# Patient Record
Sex: FEMALE | Race: WHITE | NOT HISPANIC OR LATINO | Employment: PART TIME | URBAN - METROPOLITAN AREA
[De-identification: names, ages, dates, MRNs, and addresses within clinical notes are randomized per-mention and may not be internally consistent; named-entity substitution may affect disease eponyms.]

---

## 2017-01-12 ENCOUNTER — HOSPITAL ENCOUNTER (OUTPATIENT)
Dept: MAMMOGRAPHY | Facility: HOSPITAL | Age: 57
Discharge: HOME | End: 2017-01-12
Attending: OBSTETRICS & GYNECOLOGY

## 2017-01-12 DIAGNOSIS — Z12.31 VISIT FOR SCREENING MAMMOGRAM: ICD-10-CM

## 2017-01-23 ENCOUNTER — COMMUNICATION - HEALTHEAST (OUTPATIENT)
Dept: INTERNAL MEDICINE | Facility: CLINIC | Age: 57
End: 2017-01-23

## 2017-01-23 DIAGNOSIS — E78.5 HYPERLIPIDEMIA: ICD-10-CM

## 2017-02-17 ENCOUNTER — OFFICE VISIT - HEALTHEAST (OUTPATIENT)
Dept: FAMILY MEDICINE | Facility: CLINIC | Age: 57
End: 2017-02-17

## 2017-02-17 DIAGNOSIS — H60.91 RIGHT OTITIS EXTERNA: ICD-10-CM

## 2017-03-24 ENCOUNTER — COMMUNICATION - HEALTHEAST (OUTPATIENT)
Dept: INTERNAL MEDICINE | Facility: CLINIC | Age: 57
End: 2017-03-24

## 2017-03-24 ENCOUNTER — COMMUNICATION - HEALTHEAST (OUTPATIENT)
Dept: TELEHEALTH | Facility: CLINIC | Age: 57
End: 2017-03-24

## 2017-03-24 ENCOUNTER — OFFICE VISIT - HEALTHEAST (OUTPATIENT)
Dept: FAMILY MEDICINE | Facility: CLINIC | Age: 57
End: 2017-03-24

## 2017-03-24 DIAGNOSIS — Z20.818 EXPOSURE TO STREP THROAT: ICD-10-CM

## 2017-04-13 ENCOUNTER — COMMUNICATION - HEALTHEAST (OUTPATIENT)
Dept: INTERNAL MEDICINE | Facility: CLINIC | Age: 57
End: 2017-04-13

## 2017-04-13 DIAGNOSIS — E78.5 HYPERLIPIDEMIA: ICD-10-CM

## 2017-05-11 ENCOUNTER — COMMUNICATION - HEALTHEAST (OUTPATIENT)
Dept: TELEHEALTH | Facility: CLINIC | Age: 57
End: 2017-05-11

## 2017-05-11 ENCOUNTER — COMMUNICATION - HEALTHEAST (OUTPATIENT)
Dept: INTERNAL MEDICINE | Facility: CLINIC | Age: 57
End: 2017-05-11

## 2017-05-11 ENCOUNTER — OFFICE VISIT - HEALTHEAST (OUTPATIENT)
Dept: INTERNAL MEDICINE | Facility: CLINIC | Age: 57
End: 2017-05-11

## 2017-05-11 DIAGNOSIS — E78.5 HLD (HYPERLIPIDEMIA): ICD-10-CM

## 2017-05-11 DIAGNOSIS — Z00.00 ANNUAL PHYSICAL EXAM: ICD-10-CM

## 2017-05-11 LAB
CHOLEST SERPL-MCNC: 147 MG/DL
FASTING STATUS PATIENT QL REPORTED: ABNORMAL
HDLC SERPL-MCNC: 43 MG/DL
LDLC SERPL CALC-MCNC: 69 MG/DL
TRIGL SERPL-MCNC: 174 MG/DL

## 2017-05-11 ASSESSMENT — MIFFLIN-ST. JEOR: SCORE: 1395.95

## 2017-06-05 ENCOUNTER — COMMUNICATION - HEALTHEAST (OUTPATIENT)
Dept: INTERNAL MEDICINE | Facility: CLINIC | Age: 57
End: 2017-06-05

## 2017-06-05 DIAGNOSIS — E78.5 HYPERLIPIDEMIA: ICD-10-CM

## 2017-06-08 ENCOUNTER — COMMUNICATION - HEALTHEAST (OUTPATIENT)
Dept: INTERNAL MEDICINE | Facility: CLINIC | Age: 57
End: 2017-06-08

## 2017-06-08 DIAGNOSIS — E78.5 HYPERLIPIDEMIA: ICD-10-CM

## 2017-09-20 ENCOUNTER — COMMUNICATION - HEALTHEAST (OUTPATIENT)
Dept: TELEHEALTH | Facility: CLINIC | Age: 57
End: 2017-09-20

## 2017-09-20 ENCOUNTER — OFFICE VISIT - HEALTHEAST (OUTPATIENT)
Dept: FAMILY MEDICINE | Facility: CLINIC | Age: 57
End: 2017-09-20

## 2017-09-20 DIAGNOSIS — R07.0 THROAT PAIN: ICD-10-CM

## 2017-09-20 DIAGNOSIS — J02.9 VIRAL PHARYNGITIS: ICD-10-CM

## 2017-10-18 ENCOUNTER — COMMUNICATION - HEALTHEAST (OUTPATIENT)
Dept: TELEHEALTH | Facility: CLINIC | Age: 57
End: 2017-10-18

## 2017-10-18 ENCOUNTER — OFFICE VISIT - HEALTHEAST (OUTPATIENT)
Dept: INTERNAL MEDICINE | Facility: CLINIC | Age: 57
End: 2017-10-18

## 2017-10-18 DIAGNOSIS — H60.90 OTITIS EXTERNA: ICD-10-CM

## 2017-10-18 DIAGNOSIS — Z82.0 FAMILY HISTORY OF ALZHEIMER'S DISEASE: ICD-10-CM

## 2017-10-25 ENCOUNTER — COMMUNICATION - HEALTHEAST (OUTPATIENT)
Dept: INTERNAL MEDICINE | Facility: CLINIC | Age: 57
End: 2017-10-25

## 2017-10-25 ENCOUNTER — AMBULATORY - HEALTHEAST (OUTPATIENT)
Dept: INTERNAL MEDICINE | Facility: CLINIC | Age: 57
End: 2017-10-25

## 2017-10-26 ENCOUNTER — OFFICE VISIT - HEALTHEAST (OUTPATIENT)
Dept: INTERNAL MEDICINE | Facility: CLINIC | Age: 57
End: 2017-10-26

## 2017-10-26 DIAGNOSIS — Z09 FOLLOW UP: ICD-10-CM

## 2017-10-26 DIAGNOSIS — H60.90 OTITIS EXTERNA: ICD-10-CM

## 2018-02-11 ENCOUNTER — COMMUNICATION - HEALTHEAST (OUTPATIENT)
Dept: INTERNAL MEDICINE | Facility: CLINIC | Age: 58
End: 2018-02-11

## 2018-02-12 ENCOUNTER — COMMUNICATION - HEALTHEAST (OUTPATIENT)
Dept: INTERNAL MEDICINE | Facility: CLINIC | Age: 58
End: 2018-02-12

## 2018-02-26 ENCOUNTER — OFFICE VISIT - HEALTHEAST (OUTPATIENT)
Dept: INTERNAL MEDICINE | Facility: CLINIC | Age: 58
End: 2018-02-26

## 2018-02-26 DIAGNOSIS — Z00.00 ANNUAL PHYSICAL EXAM: ICD-10-CM

## 2018-02-26 DIAGNOSIS — E78.5 HLD (HYPERLIPIDEMIA): ICD-10-CM

## 2018-02-26 DIAGNOSIS — E66.9 OBESITY (BMI 30-39.9): ICD-10-CM

## 2018-02-26 DIAGNOSIS — E55.9 VITAMIN D DEFICIENCY: ICD-10-CM

## 2018-02-26 LAB
25(OH)D3 SERPL-MCNC: 77.2 NG/ML (ref 30–80)
ALBUMIN SERPL-MCNC: 3.9 G/DL (ref 3.5–5)
ALP SERPL-CCNC: 65 U/L (ref 45–120)
ALT SERPL W P-5'-P-CCNC: 43 U/L (ref 0–45)
ANION GAP SERPL CALCULATED.3IONS-SCNC: 11 MMOL/L (ref 5–18)
AST SERPL W P-5'-P-CCNC: 32 U/L (ref 0–40)
BILIRUB DIRECT SERPL-MCNC: 0.2 MG/DL
BILIRUB SERPL-MCNC: 0.6 MG/DL (ref 0–1)
BUN SERPL-MCNC: 13 MG/DL (ref 8–22)
CALCIUM SERPL-MCNC: 9.9 MG/DL (ref 8.5–10.5)
CHLORIDE BLD-SCNC: 103 MMOL/L (ref 98–107)
CHOLEST SERPL-MCNC: 142 MG/DL
CO2 SERPL-SCNC: 25 MMOL/L (ref 22–31)
CREAT SERPL-MCNC: 0.92 MG/DL (ref 0.6–1.1)
FASTING STATUS PATIENT QL REPORTED: YES
GFR SERPL CREATININE-BSD FRML MDRD: >60 ML/MIN/1.73M2
GLUCOSE BLD-MCNC: 96 MG/DL (ref 70–125)
HDLC SERPL-MCNC: 45 MG/DL
LDLC SERPL CALC-MCNC: 64 MG/DL
POTASSIUM BLD-SCNC: 4.3 MMOL/L (ref 3.5–5)
PROT SERPL-MCNC: 6.9 G/DL (ref 6–8)
SODIUM SERPL-SCNC: 139 MMOL/L (ref 136–145)
TRIGL SERPL-MCNC: 166 MG/DL

## 2018-02-26 ASSESSMENT — MIFFLIN-ST. JEOR: SCORE: 1379.62

## 2018-02-27 ENCOUNTER — COMMUNICATION - HEALTHEAST (OUTPATIENT)
Dept: INTERNAL MEDICINE | Facility: CLINIC | Age: 58
End: 2018-02-27

## 2018-02-28 ENCOUNTER — OFFICE VISIT - HEALTHEAST (OUTPATIENT)
Dept: INTERNAL MEDICINE | Facility: CLINIC | Age: 58
End: 2018-02-28

## 2018-02-28 ENCOUNTER — COMMUNICATION - HEALTHEAST (OUTPATIENT)
Dept: INTERNAL MEDICINE | Facility: CLINIC | Age: 58
End: 2018-02-28

## 2018-02-28 DIAGNOSIS — H60.541 DERMATITIS OF RIGHT EAR CANAL: ICD-10-CM

## 2018-03-12 ENCOUNTER — HOSPITAL ENCOUNTER (OUTPATIENT)
Dept: MAMMOGRAPHY | Facility: CLINIC | Age: 58
Discharge: HOME | End: 2018-03-12
Attending: OBSTETRICS & GYNECOLOGY

## 2018-03-12 DIAGNOSIS — Z12.31 VISIT FOR SCREENING MAMMOGRAM: ICD-10-CM

## 2018-04-01 ENCOUNTER — COMMUNICATION - HEALTHEAST (OUTPATIENT)
Dept: INTERNAL MEDICINE | Facility: CLINIC | Age: 58
End: 2018-04-01

## 2018-04-09 ENCOUNTER — COMMUNICATION - HEALTHEAST (OUTPATIENT)
Dept: INTERNAL MEDICINE | Facility: CLINIC | Age: 58
End: 2018-04-09

## 2018-04-26 ENCOUNTER — OFFICE VISIT - HEALTHEAST (OUTPATIENT)
Dept: INTERNAL MEDICINE | Facility: CLINIC | Age: 58
End: 2018-04-26

## 2018-04-26 DIAGNOSIS — Z71.3 WEIGHT LOSS COUNSELING, ENCOUNTER FOR: ICD-10-CM

## 2018-04-26 DIAGNOSIS — E66.3 OVERWEIGHT: ICD-10-CM

## 2018-04-26 ASSESSMENT — MIFFLIN-ST. JEOR: SCORE: 1329.72

## 2018-05-06 ENCOUNTER — COMMUNICATION - HEALTHEAST (OUTPATIENT)
Dept: INTERNAL MEDICINE | Facility: CLINIC | Age: 58
End: 2018-05-06

## 2018-08-06 ENCOUNTER — COMMUNICATION - HEALTHEAST (OUTPATIENT)
Dept: INTERNAL MEDICINE | Facility: CLINIC | Age: 58
End: 2018-08-06

## 2018-08-06 DIAGNOSIS — E78.5 HYPERLIPIDEMIA: ICD-10-CM

## 2019-02-06 ENCOUNTER — OFFICE VISIT - HEALTHEAST (OUTPATIENT)
Dept: INTERNAL MEDICINE | Facility: CLINIC | Age: 59
End: 2019-02-06

## 2019-02-06 DIAGNOSIS — N39.0 URINARY TRACT INFECTION WITHOUT HEMATURIA, SITE UNSPECIFIED: ICD-10-CM

## 2019-02-06 LAB
ALBUMIN UR-MCNC: ABNORMAL MG/DL
APPEARANCE UR: ABNORMAL
BACTERIA #/AREA URNS HPF: ABNORMAL HPF
BILIRUB UR QL STRIP: NEGATIVE
COLOR UR AUTO: YELLOW
GLUCOSE UR STRIP-MCNC: NEGATIVE MG/DL
HGB UR QL STRIP: ABNORMAL
KETONES UR STRIP-MCNC: NEGATIVE MG/DL
LEUKOCYTE ESTERASE UR QL STRIP: ABNORMAL
NITRATE UR QL: POSITIVE
PH UR STRIP: 5.5 [PH] (ref 5–8)
RBC #/AREA URNS AUTO: ABNORMAL HPF
SP GR UR STRIP: 1.01 (ref 1–1.03)
SQUAMOUS #/AREA URNS AUTO: ABNORMAL LPF
UROBILINOGEN UR STRIP-ACNC: ABNORMAL
WBC #/AREA URNS AUTO: ABNORMAL HPF
WBC CLUMPS #/AREA URNS HPF: PRESENT /[HPF]

## 2019-02-06 ASSESSMENT — MIFFLIN-ST. JEOR: SCORE: 1334.26

## 2019-02-08 LAB — BACTERIA SPEC CULT: ABNORMAL

## 2019-02-18 ENCOUNTER — COMMUNICATION - HEALTHEAST (OUTPATIENT)
Dept: INTERNAL MEDICINE | Facility: CLINIC | Age: 59
End: 2019-02-18

## 2019-03-14 ENCOUNTER — RECORDS - HEALTHEAST (OUTPATIENT)
Dept: ADMINISTRATIVE | Facility: OTHER | Age: 59
End: 2019-03-14

## 2019-03-19 ENCOUNTER — AMBULATORY - HEALTHEAST (OUTPATIENT)
Dept: SCHEDULING | Facility: CLINIC | Age: 59
End: 2019-03-19

## 2019-03-19 DIAGNOSIS — Z78.0 MENOPAUSE: ICD-10-CM

## 2019-03-29 ENCOUNTER — COMMUNICATION - HEALTHEAST (OUTPATIENT)
Dept: INTERNAL MEDICINE | Facility: CLINIC | Age: 59
End: 2019-03-29

## 2019-04-01 ENCOUNTER — OFFICE VISIT - HEALTHEAST (OUTPATIENT)
Dept: INTERNAL MEDICINE | Facility: CLINIC | Age: 59
End: 2019-04-01

## 2019-04-01 DIAGNOSIS — Z00.00 ENCOUNTER FOR MEDICAL EXAMINATION TO ESTABLISH CARE: ICD-10-CM

## 2019-04-01 DIAGNOSIS — Z13.0 SCREENING, ANEMIA, DEFICIENCY, IRON: ICD-10-CM

## 2019-04-01 DIAGNOSIS — E55.9 VITAMIN D DEFICIENCY: ICD-10-CM

## 2019-04-01 DIAGNOSIS — E78.5 HYPERLIPIDEMIA, UNSPECIFIED HYPERLIPIDEMIA TYPE: ICD-10-CM

## 2019-04-01 DIAGNOSIS — Z00.00 ANNUAL PHYSICAL EXAM: ICD-10-CM

## 2019-04-01 DIAGNOSIS — Z12.11 SCREEN FOR COLON CANCER: ICD-10-CM

## 2019-04-01 DIAGNOSIS — Z13.29 SCREENING FOR THYROID DISORDER: ICD-10-CM

## 2019-04-01 LAB
ALBUMIN SERPL-MCNC: 3.9 G/DL (ref 3.5–5)
ALP SERPL-CCNC: 61 U/L (ref 45–120)
ALT SERPL W P-5'-P-CCNC: 33 U/L (ref 0–45)
ANION GAP SERPL CALCULATED.3IONS-SCNC: 10 MMOL/L (ref 5–18)
AST SERPL W P-5'-P-CCNC: 28 U/L (ref 0–40)
BASOPHILS # BLD AUTO: 0.1 THOU/UL (ref 0–0.2)
BASOPHILS NFR BLD AUTO: 1 % (ref 0–2)
BILIRUB SERPL-MCNC: 0.8 MG/DL (ref 0–1)
BUN SERPL-MCNC: 11 MG/DL (ref 8–22)
CALCIUM SERPL-MCNC: 10.3 MG/DL (ref 8.5–10.5)
CHLORIDE BLD-SCNC: 104 MMOL/L (ref 98–107)
CHOLEST SERPL-MCNC: 134 MG/DL
CO2 SERPL-SCNC: 28 MMOL/L (ref 22–31)
CREAT SERPL-MCNC: 1.03 MG/DL (ref 0.6–1.1)
EOSINOPHIL # BLD AUTO: 0.3 THOU/UL (ref 0–0.4)
EOSINOPHIL NFR BLD AUTO: 4 % (ref 0–6)
ERYTHROCYTE [DISTWIDTH] IN BLOOD BY AUTOMATED COUNT: 11.9 % (ref 11–14.5)
FASTING STATUS PATIENT QL REPORTED: ABNORMAL
GFR SERPL CREATININE-BSD FRML MDRD: 55 ML/MIN/1.73M2
GLUCOSE BLD-MCNC: 82 MG/DL (ref 70–125)
HCT VFR BLD AUTO: 47.4 % (ref 35–47)
HDLC SERPL-MCNC: 47 MG/DL
HGB BLD-MCNC: 15.4 G/DL (ref 12–16)
LDLC SERPL CALC-MCNC: 64 MG/DL
LYMPHOCYTES # BLD AUTO: 4 THOU/UL (ref 0.8–4.4)
LYMPHOCYTES NFR BLD AUTO: 47 % (ref 20–40)
MCH RBC QN AUTO: 30.5 PG (ref 27–34)
MCHC RBC AUTO-ENTMCNC: 32.6 G/DL (ref 32–36)
MCV RBC AUTO: 94 FL (ref 80–100)
MONOCYTES # BLD AUTO: 0.7 THOU/UL (ref 0–0.9)
MONOCYTES NFR BLD AUTO: 8 % (ref 2–10)
NEUTROPHILS # BLD AUTO: 3.4 THOU/UL (ref 2–7.7)
NEUTROPHILS NFR BLD AUTO: 40 % (ref 50–70)
PLATELET # BLD AUTO: 289 THOU/UL (ref 140–440)
PMV BLD AUTO: 7.2 FL (ref 7–10)
POTASSIUM BLD-SCNC: 4.7 MMOL/L (ref 3.5–5)
PROT SERPL-MCNC: 7.1 G/DL (ref 6–8)
RBC # BLD AUTO: 5.06 MILL/UL (ref 3.8–5.4)
SODIUM SERPL-SCNC: 142 MMOL/L (ref 136–145)
TRIGL SERPL-MCNC: 117 MG/DL
WBC: 8.5 THOU/UL (ref 4–11)

## 2019-04-01 ASSESSMENT — MIFFLIN-ST. JEOR: SCORE: 1343.9

## 2019-05-05 ENCOUNTER — COMMUNICATION - HEALTHEAST (OUTPATIENT)
Dept: INTERNAL MEDICINE | Facility: CLINIC | Age: 59
End: 2019-05-05

## 2019-05-05 DIAGNOSIS — E78.5 HYPERLIPIDEMIA: ICD-10-CM

## 2019-07-12 ENCOUNTER — HOSPITAL ENCOUNTER (OUTPATIENT)
Dept: MAMMOGRAPHY | Facility: CLINIC | Age: 59
Discharge: HOME | End: 2019-07-12

## 2019-07-12 DIAGNOSIS — Z12.31 VISIT FOR SCREENING MAMMOGRAM: ICD-10-CM

## 2019-10-02 ENCOUNTER — RECORDS - HEALTHEAST (OUTPATIENT)
Dept: ADMINISTRATIVE | Facility: OTHER | Age: 59
End: 2019-10-02

## 2019-11-04 ENCOUNTER — OFFICE VISIT - HEALTHEAST (OUTPATIENT)
Dept: FAMILY MEDICINE | Facility: CLINIC | Age: 59
End: 2019-11-04

## 2019-11-04 DIAGNOSIS — R30.0 DYSURIA: ICD-10-CM

## 2019-11-04 DIAGNOSIS — N30.01 ACUTE CYSTITIS WITH HEMATURIA: ICD-10-CM

## 2019-11-04 LAB
ALBUMIN UR-MCNC: NEGATIVE MG/DL
APPEARANCE UR: CLEAR
BACTERIA #/AREA URNS HPF: ABNORMAL HPF
BILIRUB UR QL STRIP: NEGATIVE
COLOR UR AUTO: YELLOW
GLUCOSE UR STRIP-MCNC: NEGATIVE MG/DL
HGB UR QL STRIP: ABNORMAL
KETONES UR STRIP-MCNC: NEGATIVE MG/DL
LEUKOCYTE ESTERASE UR QL STRIP: ABNORMAL
NITRATE UR QL: NEGATIVE
PH UR STRIP: 6 [PH] (ref 5–8)
RBC #/AREA URNS AUTO: ABNORMAL HPF
SP GR UR STRIP: <=1.005 (ref 1–1.03)
SQUAMOUS #/AREA URNS AUTO: ABNORMAL LPF
UROBILINOGEN UR STRIP-ACNC: ABNORMAL
WBC #/AREA URNS AUTO: ABNORMAL HPF

## 2019-11-07 LAB — BACTERIA SPEC CULT: ABNORMAL

## 2019-11-12 ENCOUNTER — COMMUNICATION - HEALTHEAST (OUTPATIENT)
Dept: INTERNAL MEDICINE | Facility: CLINIC | Age: 59
End: 2019-11-12

## 2019-11-12 ENCOUNTER — COMMUNICATION - HEALTHEAST (OUTPATIENT)
Dept: PHARMACY | Facility: HOSPITAL | Age: 59
End: 2019-11-12

## 2019-11-12 DIAGNOSIS — T36.95XA ANTIBIOTIC CAUSING ADVERSE EFFECT: ICD-10-CM

## 2019-11-20 ENCOUNTER — COMMUNICATION - HEALTHEAST (OUTPATIENT)
Dept: PHARMACY | Facility: HOSPITAL | Age: 59
End: 2019-11-20

## 2019-11-20 DIAGNOSIS — T36.95XA ANTIBIOTIC CAUSING ADVERSE EFFECT: ICD-10-CM

## 2020-06-01 ENCOUNTER — COMMUNICATION - HEALTHEAST (OUTPATIENT)
Dept: INTERNAL MEDICINE | Facility: CLINIC | Age: 60
End: 2020-06-01

## 2020-06-01 DIAGNOSIS — E78.5 HYPERLIPIDEMIA: ICD-10-CM

## 2020-06-11 ENCOUNTER — COMMUNICATION - HEALTHEAST (OUTPATIENT)
Dept: INTERNAL MEDICINE | Facility: CLINIC | Age: 60
End: 2020-06-11

## 2020-06-11 DIAGNOSIS — E78.5 HYPERLIPIDEMIA: ICD-10-CM

## 2020-06-17 ENCOUNTER — COMMUNICATION - HEALTHEAST (OUTPATIENT)
Dept: INTERNAL MEDICINE | Facility: CLINIC | Age: 60
End: 2020-06-17

## 2020-06-17 DIAGNOSIS — E78.5 HYPERLIPIDEMIA: ICD-10-CM

## 2020-06-22 ENCOUNTER — OFFICE VISIT - HEALTHEAST (OUTPATIENT)
Dept: INTERNAL MEDICINE | Facility: CLINIC | Age: 60
End: 2020-06-22

## 2020-06-22 DIAGNOSIS — M54.50 BILATERAL LOW BACK PAIN WITHOUT SCIATICA, UNSPECIFIED CHRONICITY: ICD-10-CM

## 2020-06-22 DIAGNOSIS — E55.9 VITAMIN D DEFICIENCY: ICD-10-CM

## 2020-06-22 DIAGNOSIS — M70.62 GREATER TROCHANTERIC BURSITIS OF LEFT HIP: ICD-10-CM

## 2020-06-22 DIAGNOSIS — E78.5 HYPERLIPIDEMIA, UNSPECIFIED HYPERLIPIDEMIA TYPE: ICD-10-CM

## 2020-06-22 RX ORDER — ROSUVASTATIN CALCIUM 40 MG/1
40 TABLET, COATED ORAL AT BEDTIME
Qty: 90 TABLET | Refills: 3 | Status: SHIPPED | OUTPATIENT
Start: 2020-06-22 | End: 2021-08-06

## 2020-06-22 ASSESSMENT — MIFFLIN-ST. JEOR: SCORE: 1393.79

## 2020-06-23 ENCOUNTER — AMBULATORY - HEALTHEAST (OUTPATIENT)
Dept: LAB | Facility: CLINIC | Age: 60
End: 2020-06-23

## 2020-06-23 DIAGNOSIS — E78.5 HYPERLIPIDEMIA, UNSPECIFIED HYPERLIPIDEMIA TYPE: ICD-10-CM

## 2020-06-23 DIAGNOSIS — E55.9 VITAMIN D DEFICIENCY: ICD-10-CM

## 2020-06-23 LAB
ALBUMIN SERPL-MCNC: 4.1 G/DL (ref 3.5–5)
ALP SERPL-CCNC: 78 U/L (ref 45–120)
ALT SERPL W P-5'-P-CCNC: 24 U/L (ref 0–45)
ANION GAP SERPL CALCULATED.3IONS-SCNC: 9 MMOL/L (ref 5–18)
AST SERPL W P-5'-P-CCNC: 24 U/L (ref 0–40)
BASOPHILS # BLD AUTO: 0.1 THOU/UL (ref 0–0.2)
BASOPHILS NFR BLD AUTO: 1 % (ref 0–2)
BILIRUB SERPL-MCNC: 0.6 MG/DL (ref 0–1)
BUN SERPL-MCNC: 12 MG/DL (ref 8–22)
CALCIUM SERPL-MCNC: 9.9 MG/DL (ref 8.5–10.5)
CHLORIDE BLD-SCNC: 104 MMOL/L (ref 98–107)
CHOLEST SERPL-MCNC: 144 MG/DL
CO2 SERPL-SCNC: 26 MMOL/L (ref 22–31)
CREAT SERPL-MCNC: 0.98 MG/DL (ref 0.6–1.1)
EOSINOPHIL # BLD AUTO: 0.3 THOU/UL (ref 0–0.4)
EOSINOPHIL NFR BLD AUTO: 5 % (ref 0–6)
ERYTHROCYTE [DISTWIDTH] IN BLOOD BY AUTOMATED COUNT: 12.4 % (ref 11–14.5)
FASTING STATUS PATIENT QL REPORTED: YES
GFR SERPL CREATININE-BSD FRML MDRD: 58 ML/MIN/1.73M2
GLUCOSE BLD-MCNC: 91 MG/DL (ref 70–125)
HBA1C MFR BLD: 5.6 % (ref 3.5–6)
HCT VFR BLD AUTO: 43.8 % (ref 35–47)
HDLC SERPL-MCNC: 45 MG/DL
HGB BLD-MCNC: 14.1 G/DL (ref 12–16)
LDLC SERPL CALC-MCNC: 65 MG/DL
LYMPHOCYTES # BLD AUTO: 3.2 THOU/UL (ref 0.8–4.4)
LYMPHOCYTES NFR BLD AUTO: 45 % (ref 20–40)
MCH RBC QN AUTO: 30.7 PG (ref 27–34)
MCHC RBC AUTO-ENTMCNC: 32.2 G/DL (ref 32–36)
MCV RBC AUTO: 95 FL (ref 80–100)
MONOCYTES # BLD AUTO: 0.6 THOU/UL (ref 0–0.9)
MONOCYTES NFR BLD AUTO: 8 % (ref 2–10)
NEUTROPHILS # BLD AUTO: 3 THOU/UL (ref 2–7.7)
NEUTROPHILS NFR BLD AUTO: 42 % (ref 50–70)
PLATELET # BLD AUTO: 312 THOU/UL (ref 140–440)
PMV BLD AUTO: 10.1 FL (ref 8.5–12.5)
POTASSIUM BLD-SCNC: 4.3 MMOL/L (ref 3.5–5)
PROT SERPL-MCNC: 7.1 G/DL (ref 6–8)
RBC # BLD AUTO: 4.6 MILL/UL (ref 3.8–5.4)
SODIUM SERPL-SCNC: 139 MMOL/L (ref 136–145)
TRIGL SERPL-MCNC: 171 MG/DL
TSH SERPL DL<=0.005 MIU/L-ACNC: 2.69 UIU/ML (ref 0.3–5)
WBC: 7.1 THOU/UL (ref 4–11)

## 2020-06-25 LAB — 25(OH)D3 SERPL-MCNC: 86.3 NG/ML (ref 30–80)

## 2020-07-15 ENCOUNTER — HOSPITAL ENCOUNTER (OUTPATIENT)
Dept: MAMMOGRAPHY | Facility: CLINIC | Age: 60
Discharge: HOME | End: 2020-07-15

## 2020-07-15 DIAGNOSIS — Z12.31 VISIT FOR SCREENING MAMMOGRAM: ICD-10-CM

## 2020-09-16 ENCOUNTER — OFFICE VISIT - HEALTHEAST (OUTPATIENT)
Dept: FAMILY MEDICINE | Facility: CLINIC | Age: 60
End: 2020-09-16

## 2020-09-16 DIAGNOSIS — J02.9 SORE THROAT: ICD-10-CM

## 2020-09-16 DIAGNOSIS — J35.8 TONSILLITH: ICD-10-CM

## 2020-09-24 ENCOUNTER — RECORDS - HEALTHEAST (OUTPATIENT)
Dept: ADMINISTRATIVE | Facility: OTHER | Age: 60
End: 2020-09-24

## 2020-09-24 LAB — PAP SMEAR - HIM PATIENT REPORTED: NORMAL

## 2020-10-02 ENCOUNTER — OFFICE VISIT - HEALTHEAST (OUTPATIENT)
Dept: INTERNAL MEDICINE | Facility: CLINIC | Age: 60
End: 2020-10-02

## 2020-10-02 DIAGNOSIS — E78.5 HYPERLIPIDEMIA, UNSPECIFIED HYPERLIPIDEMIA TYPE: ICD-10-CM

## 2020-10-02 DIAGNOSIS — K60.2 RECTAL FISSURE: ICD-10-CM

## 2020-10-02 DIAGNOSIS — K62.5 RECTAL BLEEDING: ICD-10-CM

## 2020-10-02 DIAGNOSIS — K64.9 HEMORRHOIDS, UNSPECIFIED HEMORRHOID TYPE: ICD-10-CM

## 2020-10-02 DIAGNOSIS — N95.1 SYMPTOMATIC MENOPAUSAL OR FEMALE CLIMACTERIC STATES: ICD-10-CM

## 2020-11-03 ENCOUNTER — RECORDS - HEALTHEAST (OUTPATIENT)
Dept: HEALTH INFORMATION MANAGEMENT | Facility: CLINIC | Age: 60
End: 2020-11-03

## 2021-03-10 ENCOUNTER — AMBULATORY - HEALTHEAST (OUTPATIENT)
Dept: LAB | Facility: CLINIC | Age: 61
End: 2021-03-10

## 2021-03-10 DIAGNOSIS — L65.9 NON-SCARRING HAIR LOSS: ICD-10-CM

## 2021-03-10 LAB
FERRITIN SERPL-MCNC: 100 NG/ML (ref 10–130)
FOLATE SERPL-MCNC: >20 NG/ML
T4 FREE SERPL-MCNC: 0.8 NG/DL (ref 0.7–1.8)
TSH SERPL DL<=0.005 MIU/L-ACNC: 1.59 UIU/ML (ref 0.3–5)

## 2021-03-11 LAB — 25(OH)D3 SERPL-MCNC: 81.6 NG/ML (ref 30–80)

## 2021-03-12 LAB
ANA SER QL: 0.3 U
DHEA-S SERPL-MCNC: 53 UG/DL (ref 13–130)
SHBG SERPL-SCNC: 21 NMOL/L (ref 30–135)
TESTOST FREE SERPL-MCNC: 0.47 NG/DL (ref 0.06–0.38)
TESTOST SERPL-MCNC: 21 NG/DL (ref 8–60)

## 2021-03-13 LAB — ZINC SERPL-MCNC: 87.1 UG/DL (ref 60–120)

## 2021-03-15 LAB — ANDROST SERPL-MCNC: 0.2 NG/ML (ref 0.13–0.82)

## 2021-03-17 LAB — 1,25(OH)2D SERPL-MCNC: 67 PG/ML (ref 19.9–79.3)

## 2021-05-25 ENCOUNTER — RECORDS - HEALTHEAST (OUTPATIENT)
Dept: ADMINISTRATIVE | Facility: CLINIC | Age: 61
End: 2021-05-25

## 2021-05-26 ENCOUNTER — RECORDS - HEALTHEAST (OUTPATIENT)
Dept: ADMINISTRATIVE | Facility: CLINIC | Age: 61
End: 2021-05-26

## 2021-05-27 ENCOUNTER — RECORDS - HEALTHEAST (OUTPATIENT)
Dept: ADMINISTRATIVE | Facility: CLINIC | Age: 61
End: 2021-05-27

## 2021-05-27 NOTE — PROGRESS NOTES
Assessment/Plan:   1. Screen for colon cancer  - Ambulatory referral for Colonoscopy    2. Hyperlipidemia, unspecified hyperlipidemia type  - Lipid [place]ascade RANDOM  - Comprehensive Metabolic Panel    3. Screening for thyroid disorder  Will continue to be followed with medical specialist for TSH screening  4. Screening, anemia, deficiency, iron  - HM1(CBC and Differential)  - HM1 (CBC with Diff)    5. Annual physical exam  The patient will follow-up in 1 year, sooner if needed  6. Encounter for medical examination to establish care  Follow-up 1 year  7. Vitamin D deficiency  Continue vitamin D supplement daily.  Recommend 30 minutes of purposeful activity most days of the week.    Patient will follow-up in 1 year, sooner if needed.  All patient's questions addressed verbalized understanding and agree with plan.    Subjective:      Maria Elena Abreu is a 58 y.o. female who presents for an annual exam. The patient is sexually active. The patient participates in regular exercise: yes. The patient reports that there is not domestic violence in her life.     Patient is a history of vitamin D deficiency.  She continues on vitamin D supplement daily.    Patient has a history of hyperlipidemia patient continues on Crestor 40 mg daily and 81 mg of aspirin, omega-3 1200 mg twice a daily.    Last [facility] CNP with bioidentical hormone replacement therapies and sexual health checked thyroid this year. Progesterone and testosterone is also managed by his office.     Patient reports that she completes her mammogram and Pap smears with women's health.    Patient denies any additional concerns.  Healthy Habits:   Regular Exercise: Yes  Sunscreen Use: Yes  Healthy Diet: Yes  Dental Visits Regularly: Yes  Seat Belt: Yes  Sexually active: Yes  Self Breast Exam Monthly:Yes  Hemoccults: N/A  Colonoscopy: Yes  Lipid Profile: Yes  Glucose Screen: Yes  Prevention of Osteoporosis: Yes  Last Dexa: Yes  Guns at Home:  No      Immunization  History   Administered Date(s) Administered     Hep B, historic 2010, 2010, 10/20/2010     Influenza, inj, historic,unspecified 10/05/2017, 10/04/2018     Tdap 10/20/2010     ZOSTER, LIVE 10/27/2014     Immunization status: up to date and documented.    Gynecologic History  No LMP recorded. Patient is postmenopausal.  Contraception: post menopausal status  Last Pap: 2019. Results were: normal  Last mammogram:  Results were: normal      OB History    Para Term  AB Living   3 3 3         SAB TAB Ectopic Multiple Live Births                  # Outcome Date GA Lbr Sukhjinder/2nd Weight Sex Delivery Anes PTL Lv   3 Term            2 Term            1 Term                   Current Outpatient Medications   Medication Sig Dispense Refill     aspirin 81 MG EC tablet Take 81 mg by mouth daily.       calcium-vitamin D 250-100 mg-unit per tablet Take 2 tablets by mouth 2 (two) times a day.       cholecalciferol, vitamin D3, 5,000 unit Tab Take 10,000 Units by mouth daily.       coenzyme Q10 100 mg capsule Take 300 mg by mouth daily.       Lactobacillus rhamnosus GG (CULTURELLE) 10-15 Billion cell capsule Take 1 capsule by mouth 2 (two) times a day.       MULTIVITS-MIN/IRON/FA/LUTEIN (CENTRUM SILVER WOMEN ORAL) Take by mouth.       omega-3 fatty acids-vitamin E (FISH OIL) 1,000 mg cap Take 1,200 mg by mouth 2 (two) times a day.       progesterone (PROMETRIUM) 100 MG capsule Take 100 mg by mouth 3 (three) times a day. 1 in the AM and 2 in the PM.       RESVERATROL ORAL Take 2 tablets by mouth 2 (two) times a day.       rosuvastatin (CRESTOR) 40 MG tablet TAKE ONE TABLET BY MOUTH AT BEDTIME  90 tablet 2     TESTOSTERONE IM Inject 0.2 mL into the shoulder, thigh, or buttocks.        No current facility-administered medications for this visit.      No past medical history on file.  Past Surgical History:   Procedure Laterality Date     AUGMENTATION MAMMAPLASTY Bilateral      Ragweed pollen  Family History    Problem Relation Age of Onset     Diabetes Mother      Heart disease Mother      Hypertension Mother      Hyperlipidemia Mother      Osteoporosis Mother      Hyperlipidemia Father      Stroke Maternal Grandmother      Alzheimer's disease Sister      Social History     Socioeconomic History     Marital status:      Spouse name: Not on file     Number of children: 3     Years of education: Not on file     Highest education level: Not on file   Occupational History     Occupation: Medical Assistant    Social Needs     Financial resource strain: Not on file     Food insecurity:     Worry: Not on file     Inability: Not on file     Transportation needs:     Medical: Not on file     Non-medical: Not on file   Tobacco Use     Smoking status: Never Smoker     Smokeless tobacco: Never Used   Substance and Sexual Activity     Alcohol use: No     Drug use: No     Sexual activity: Yes   Lifestyle     Physical activity:     Days per week: Not on file     Minutes per session: Not on file     Stress: Not on file   Relationships     Social connections:     Talks on phone: Not on file     Gets together: Not on file     Attends Yazidi service: Not on file     Active member of club or organization: Not on file     Attends meetings of clubs or organizations: Not on file     Relationship status: Not on file     Intimate partner violence:     Fear of current or ex partner: Not on file     Emotionally abused: Not on file     Physically abused: Not on file     Forced sexual activity: Not on file   Other Topics Concern     Not on file   Social History Narrative     Not on file       Review of Systems  Review of Systems     Review of Systems - General ROS: negative  Psychological ROS: negative  Ophthalmic ROS: negative  ENT ROS: negative  Allergy and Immunology ROS: negative  Hematological and Lymphatic ROS: negative  Endocrine ROS: negative  Breast ROS: negative for breast lumps  Respiratory ROS: no cough, shortness of breath,  "or wheezing  Cardiovascular ROS: no chest pain or dyspnea on exertion  Gastrointestinal ROS: no abdominal pain, change in bowel habits, or black or bloody stools  Genito-Urinary ROS: no dysuria, trouble voiding, or hematuria  Musculoskeletal ROS: negative  Neurological ROS: no TIA or stroke symptoms  Dermatological ROS: negative      Objective:         Vitals:    04/01/19 1140   BP: 130/86   Pulse: 88   Temp: 98.8  F (37.1  C)   SpO2: 94%   Weight: 177 lb (80.3 kg)   Height: 5' 3.75\" (1.619 m)     Body mass index is 30.62 kg/m .    Physical  Physical Exam    General appearance: alert, appears stated age, cooperative and uncooperative  Head: Normocephalic, without obvious abnormality, atraumatic  Eyes: negative findings: lids and lashes normal, conjunctivae and sclerae normal and pupils equal, round, reactive to light and accomodation  Ears: normal TM's and external ear canals both ears  Nose: Nares normal. Septum midline. Mucosa normal. No drainage or sinus tenderness.  Throat: lips, mucosa, and tongue normal; teeth and gums normal  Neck: no adenopathy, no carotid bruit, no JVD, supple, symmetrical, trachea midline and thyroid not enlarged, symmetric, no tenderness/mass/nodules  Lungs: clear to auscultation bilaterally  Breasts: normal appearance, no masses or tenderness  Heart: regular rate and rhythm, S1, S2 normal, no murmur, click, rub or gallop  Abdomen: soft, non-tender; bowel sounds normal; no masses,  no organomegaly  Pelvic: Deferred  Extremities: extremities normal, atraumatic, no cyanosis or edema  Pulses: 2+ and symmetric  Skin: Skin color, texture, turgor normal. No rashes or lesions  Lymph nodes: Cervical, supraclavicular, and axillary nodes normal.  Neurologic: Grossly normal    "

## 2021-05-28 ENCOUNTER — RECORDS - HEALTHEAST (OUTPATIENT)
Dept: ADMINISTRATIVE | Facility: CLINIC | Age: 61
End: 2021-05-28

## 2021-05-28 NOTE — TELEPHONE ENCOUNTER
Refill Approved    Rx renewed per Medication Renewal Policy. Medication was last renewed on 8/6/18  OV 4/1/19.    Sharri Ashford, [facility] Triage/Med Refill 5/5/2019     Requested Prescriptions   Pending Prescriptions Disp Refills     rosuvastatin (CRESTOR) 40 MG tablet [Pharmacy Med Name: Rosuvastatin Calcium Oral Tablet 40 MG] 90 tablet 1     Sig: TAKE ONE TABLET BY MOUTH AT BEDTIME       Statins Refill Protocol (Hmg CoA Reductase Inhibitors) Passed - 5/5/2019 11:58 AM        Passed - PCP or prescribing provider visit in past 12 months      Last office visit with prescriber/PCP: 4/26/2018 Teagan Moreno FNP OR same dept: 2/6/2019 Kristel Perez CNP OR same specialty: 2/6/2019 Kristel Perez CNP  Last physical: 2/26/2018 Last MTM visit: Visit date not found   Next visit within 3 mo: Visit date not found  Next physical within 3 mo: Visit date not found  Prescriber OR PCP: LENI Barger  Last diagnosis associated with med order: 1. Hyperlipidemia  - rosuvastatin (CRESTOR) 40 MG tablet [Pharmacy Med Name: Rosuvastatin Calcium Oral Tablet 40 MG]; TAKE ONE TABLET BY MOUTH AT BEDTIME   Dispense: 90 tablet; Refill: 1    If protocol passes may refill for 12 months if within 3 months of last provider visit (or a total of 15 months).

## 2021-05-30 VITALS — BODY MASS INDEX: 32.61 KG/M2 | WEIGHT: 190 LBS

## 2021-05-31 ENCOUNTER — RECORDS - HEALTHEAST (OUTPATIENT)
Dept: ADMINISTRATIVE | Facility: CLINIC | Age: 61
End: 2021-05-31

## 2021-05-31 VITALS — BODY MASS INDEX: 30.73 KG/M2 | WEIGHT: 179 LBS

## 2021-05-31 VITALS — BODY MASS INDEX: 32.03 KG/M2 | HEIGHT: 64 IN | WEIGHT: 187.6 LBS

## 2021-05-31 VITALS — BODY MASS INDEX: 31 KG/M2 | WEIGHT: 180.6 LBS

## 2021-05-31 VITALS — BODY MASS INDEX: 31.6 KG/M2 | WEIGHT: 184.1 LBS

## 2021-06-01 VITALS — HEIGHT: 64 IN | BODY MASS INDEX: 31.41 KG/M2 | WEIGHT: 184 LBS

## 2021-06-01 VITALS — WEIGHT: 183 LBS | BODY MASS INDEX: 31.41 KG/M2

## 2021-06-01 VITALS — HEIGHT: 64 IN | WEIGHT: 173 LBS | BODY MASS INDEX: 29.53 KG/M2

## 2021-06-02 VITALS — HEIGHT: 64 IN | WEIGHT: 174 LBS | BODY MASS INDEX: 29.71 KG/M2

## 2021-06-02 VITALS — HEIGHT: 64 IN | BODY MASS INDEX: 30.22 KG/M2 | WEIGHT: 177 LBS

## 2021-06-03 VITALS
RESPIRATION RATE: 16 BRPM | OXYGEN SATURATION: 96 % | BODY MASS INDEX: 32.02 KG/M2 | DIASTOLIC BLOOD PRESSURE: 81 MMHG | WEIGHT: 185.1 LBS | SYSTOLIC BLOOD PRESSURE: 127 MMHG | HEART RATE: 84 BPM | TEMPERATURE: 98.5 F

## 2021-06-03 NOTE — PROGRESS NOTES
Chief Complaint   Patient presents with     Dysuria     right lower back, urgency, frequency, burning      HPI: Maria Elena Abreu is a 59 y.o. female who complains of dysuria, urinary frequency & urgency, and R low back pain onset today. Pt has hx of UTIs, none recently. No treatments tried. No known alleviating or aggravating factors. Symptoms are moderate in severity & constant in duration. Denies hematuria, vaginal discharge, genital sores, abd pain, flank pain, fever/chills, N/V/D, and any other symptoms.    Problem List:  2017-10: Family history of Alzheimer's disease  HLD (hyperlipidemia)  Menopause      No past medical history on file.    Social History     Tobacco Use     Smoking status: Never Smoker     Smokeless tobacco: Never Used   Substance Use Topics     Alcohol use: No       Review of Systems   Constitutional: Negative for chills and fever.   Respiratory: Negative for shortness of breath.    Cardiovascular: Negative for chest pain.   Gastrointestinal: Negative for diarrhea, nausea and vomiting.   Genitourinary: Positive for dysuria, frequency and urgency. Negative for flank pain and hematuria.   Musculoskeletal: Positive for back pain.   All other systems reviewed and are negative.      Vitals:    11/04/19 1557   BP: 127/81   Pulse: 84   Resp: 16   Temp: 98.5  F (36.9  C)   TempSrc: Oral   SpO2: 96%   Weight: 185 lb 1.6 oz (84 kg)       Physical Exam   Constitutional: She appears well-developed and well-nourished.   Pulmonary/Chest: Effort normal.   Abdominal: Normal appearance. There is no tenderness. There is no CVA tenderness.   Neurological: She is alert.   Skin: Skin is warm and dry.   Psychiatric: She has a normal mood and affect.         Labs:  Recent Results (from the past 72 hour(s))   Urinalysis-UC if Indicated   Result Value Ref Range    Color, UA Yellow Colorless, Yellow, Straw, Light Yellow    Clarity, UA Clear Clear    Glucose, UA Negative Negative    Bilirubin, UA Negative Negative     Ketones, UA Negative Negative    Specific Gravity, UA <=1.005 1.005 - 1.030    Blood, UA Small (!) Negative    pH, UA 6.0 5.0 - 8.0    Protein, UA Negative Negative mg/dL    Urobilinogen, UA 0.2 E.U./dL 0.2 E.U./dL, 1.0 E.U./dL    Nitrite, UA Negative Negative    Leukocytes, UA Small (!) Negative    Bacteria, UA Few (!) None Seen hpf    RBC, UA 3-5 (!) None Seen, 0-2 hpf    WBC, UA 5-10 (!) None Seen, 0-5 hpf    Squam Epithel, UA 5-10 (!) None Seen, 0-5 lpf           Clinical Decision Making:    UA c/w UTI, no s/sx pyelonephritis. Rx Macrobid,culture pending.    At the end of the encounter, I discussed results, diagnosis, medications. Discussed red flags for immediate return to clinic/ER, as well as indications for follow up if no improvement. Patient understood and agreed to plan. Patient was stable for discharge.    1. Acute cystitis with hematuria  nitrofurantoin, macrocrystal-monohydrate, (MACROBID) 100 MG capsule   2. Dysuria  Urinalysis-UC if Indicated    Culture, Urine         Return in about 3 days (around 11/7/2019) for Follow up w/ primary care provider if not improved.    IZAIAH Falk, [facility]

## 2021-06-03 NOTE — TELEPHONE ENCOUNTER
Who is calling:  Patient is calling  Reason for Call:  Patient too antibiotics for a UTI on 11/4/19 at the walk in clinic. Finished Bactrim on Sunday, 11/10/19. Is now itching from the antibiotic.  Patient states this if very common for her.  Patient does not want to come in but would like fluconazole tablets called in for her.   Date of last appointment with primary care: 04/01/19  Okay to leave a detailed message: Yes

## 2021-06-04 VITALS
DIASTOLIC BLOOD PRESSURE: 74 MMHG | HEIGHT: 64 IN | HEART RATE: 90 BPM | SYSTOLIC BLOOD PRESSURE: 134 MMHG | WEIGHT: 188 LBS | BODY MASS INDEX: 32.1 KG/M2 | OXYGEN SATURATION: 96 % | RESPIRATION RATE: 20 BRPM

## 2021-06-05 VITALS
SYSTOLIC BLOOD PRESSURE: 124 MMHG | WEIGHT: 192 LBS | OXYGEN SATURATION: 98 % | TEMPERATURE: 98.6 F | HEART RATE: 89 BPM | DIASTOLIC BLOOD PRESSURE: 74 MMHG | BODY MASS INDEX: 33.22 KG/M2

## 2021-06-08 NOTE — TELEPHONE ENCOUNTER
I reached out to the patient to schedule a follow up with Kristel grimm as the patient is due for labs and a follow up for any further refills. I asked the patient to return my call.     Please advise on pended order for a 30 day supply of Crestor.

## 2021-06-08 NOTE — TELEPHONE ENCOUNTER
Patient has pending appt on 6/22/2020 with PCP. Patient would like to use the [employer] mail order.

## 2021-06-08 NOTE — PROGRESS NOTES
Chief Complaint   Patient presents with     Ear Pain     x 1 day. Right ear pain started yesterday       HPI:    Patient is here for one day of mild to moderate right ear pain yesterday, worsened after her right ear was cleaned by her girlfriend. No bleeding, discharge from ear. No fever, cough, nasal symptoms. Right sided hearing slightly reduced.     ROS: Pertinent ROS noted in HPI.     No Known Allergies    Patient Active Problem List   Diagnosis     Hyperlipidemia     Menopause       No family history on file.    Social History     Social History     Marital status:      Spouse name: N/A     Number of children: N/A     Years of education: N/A     Occupational History     Not on file.     Social History Main Topics     Smoking status: Never Smoker     Smokeless tobacco: Never Used     Alcohol use Not on file     Drug use: Not on file     Sexual activity: Not on file     Other Topics Concern     Not on file     Social History Narrative         Objective:    Vitals:    02/17/17 1312   BP: 132/78   Pulse: 87   Resp: 12   Temp: 98.1  F (36.7  C)   SpO2: 96%       Gen:NAD  Throat: normal  Ears: R TM normal, R ear canal erythematous and swollen, with tenderness upon manipulation of right external ear, L TM and canal normal  Neck: no adenopathy  CV: RRR, no M, R, G  Pulm: CTAB    Right otitis externa  -     neomycin-polymyxin-hydrocortisone (CORTISPORIN) otic solution; Administer 4 drops to right ear 4 times daily for 10 days.    Supportive cares and follow up as directed.

## 2021-06-08 NOTE — TELEPHONE ENCOUNTER
Refill Request  Did you contact pharmacy: Yes  Medication name:   Requested Prescriptions     Pending Prescriptions Disp Refills     rosuvastatin (CRESTOR) 40 MG tablet 30 tablet 0     Sig: Take 1 tablet (40 mg total) by mouth at bedtime.     Who prescribed the medication: Kristel Perez CNP   Prescription should go to [facility]. [facility]   Requested Pharmacy: [facility]  Is patient out of medication: No.  12 days left  Patient notified refills processed in 3 business days:  yes  Okay to leave a detailed message: yes

## 2021-06-09 NOTE — PROGRESS NOTES
Internal Medicine Office Visit  [facility]  Patient Name: Maria Elena Abreu  Patient Age: 60 y.o.  YOB: 1960  MRN: [id number]    Date of Visit: 2020  Reason for Office Visit:   Chief Complaint   Patient presents with     Follow-up     Needing medication refills and lab work. Patient is not fasting.            Assessment / Plan / Medical Decision Makin. Hyperlipidemia, unspecified hyperlipidemia type  Patient will [facility] for out-of-pocket cost for calcium score  - Lipid Benzie FASTING; Future  - Comprehensive Metabolic Panel; Future  - Thyroid Stimulating Hormone (TSH); Future  - HM1(CBC and Differential); Future  - Glycosylated Hemoglobin A1c; Future  - rosuvastatin (CRESTOR) 40 MG tablet; Take 1 tablet (40 mg total) by mouth at bedtime.  Dispense: 90 tablet; Refill: 3    2. Vitamin D deficiency  - Vitamin D, Total (25-Hydroxy); Future    3. Body mass index (BMI) of 32.0 to 32.9 in adult  30 minutes purposeful activity most days of the week outside of regular schedule is encouraged    4. Bilateral low back pain without sciatica, unspecified chronicity  Patient continue follow-up with chiropractic care in addition to obtaining MRI results referral to spine center will be based upon MRI results    5. Greater trochanteric bursitis of left hip  Patient advised utilize over-the-counter NSAIDs as needed      Orders Placed This Encounter   Procedures     Lipid Benzie FASTING     Comprehensive Metabolic Panel     Thyroid Stimulating Hormone (TSH)     Glycosylated Hemoglobin A1c     Vitamin D, Total (25-Hydroxy)   Followup: Return in about 6 months (around 2020). earlier if needed.    Health Maintenance Review  Health Maintenance   Topic Date Due     HEPATITIS C SCREENING  1960     HIV SCREENING  1974     ZOSTER VACCINES (2 of 3) 2014     PAP SMEAR  10/15/2017     PREVENTIVE CARE VISIT  2020     TD 18+ HE  10/20/2020      ADVANCE CARE PLANNING  10/21/2020     MAMMOGRAM  07/12/2021     LIPID  04/01/2024     COLORECTAL CANCER SCREENING  10/03/2024     INFLUENZA VACCINE RULE BASED  Completed     TDAP ADULT ONE TIME DOSE  Completed         I am having Maria Elena Abreu maintain her aspirin, MULTIVITS-MIN/IRON/FA/LUTEIN (CENTRUM SILVER WOMEN ORAL), progesterone, cholecalciferol (vitamin D3), coenzyme Q10, calcium-vitamin D, omega-3 fatty acids-vitamin E, Lactobacillus rhamnosus GG, TESTOSTERONE IM, RESVERATROL ORAL, and rosuvastatin.      HPI:  Maria Elena Abreu is a 60 y.o. year old who presents to the office today with chronic conditions including Vitamin D deficiency, history of hyperlipidemia.  Patient is followed by Last Rose CNP for bioidentical hormone replacement therapies and sexual health who also prescribes and manages patient's progesterone and testosterone.    Interested in calcium score but not covered by insurance.      The 10-year ASCVD risk score ([place]ingtonvladimir WRIGHT Jr., et al., 2013) is: 3.3%    Values used to calculate the score:      Age: 60 years      Sex: Female      Is Non- : No      Diabetic: No      Tobacco smoker: No      Systolic Blood Pressure: 134 mmHg      Is BP treated: No      HDL Cholesterol: 47 mg/dL      Total Cholesterol: 134 mg/dL      She reports some low back pain in addition to left groin pain after sudden movement.  MRI completed last week.  Left lateral hip pain. Is followed by chiropractic.      colonoscopy 10.2019 with recommendation to repeat in 5 years. She reports she has a fissure and did have some blood this morning with bowel movement.     Review of Systems- pertinent positive in bold:  Constitutional: Fever, chills, night sweats, fainting, weight change, fatigue, dizziness, sleeping difficulties, loud snoring/pauses in breathing  Eyes: change in vision, blurred or double vision, redness/eye pain  Ears, nose, mouth, throat: change in hearing, ear pain, hoarseness,  difficulty swallowing, sores in the mouth or throat  Respiratory: shortness of breath, cough, bloody sputum, wheezing  Cardiovascular: chest pain, palpitations   Gastrointestinal: abdominal pain, heartburn/indigestion, nausea/vomiting, change in appetite, change in bowel habits, constipation or diarrhea, rectal bleeding/dark stools, difficulty swallowing  Urinary: painful urination, frequent urination, urinary urgency/incontinence, blood in urine/dark urine, nocturia (new or increasing), muscle cramps, swelling of hands, feet, ankles, leg pain/redness  Skin: change in moles/freckles, rash, nodules  Hematologic/lymphatic: swollen lymph glands, abnormal bruising/bleeding  Endocrine: excessive thirst/urination, cold or heat intolerance  Neurologic/emotional: worrisome memory change, numbness/tingling, anxiety, mood swings      Current Scheduled Meds:  Outpatient Encounter Medications as of 6/22/2020   Medication Sig Dispense Refill     aspirin 81 MG EC tablet Take 81 mg by mouth daily.       calcium-vitamin D 250-100 mg-unit per tablet Take 2 tablets by mouth 2 (two) times a day.       cholecalciferol, vitamin D3, 5,000 unit Tab Take 10,000 Units by mouth daily.       coenzyme Q10 100 mg capsule Take 300 mg by mouth daily.       Lactobacillus rhamnosus GG (CULTURELLE) 10-15 Billion cell capsule Take 1 capsule by mouth 2 (two) times a day.       MULTIVITS-MIN/IRON/FA/LUTEIN (CENTRUM SILVER WOMEN ORAL) Take by mouth.       omega-3 fatty acids-vitamin E (FISH OIL) 1,000 mg cap Take 1,200 mg by mouth 2 (two) times a day.       progesterone (PROMETRIUM) 100 MG capsule Take 100 mg by mouth 3 (three) times a day. 1 in the AM and 2 in the PM.       RESVERATROL ORAL Take 2 tablets by mouth 2 (two) times a day.       rosuvastatin (CRESTOR) 40 MG tablet Take 1 tablet (40 mg total) by mouth at bedtime. 90 tablet 3     TESTOSTERONE IM Inject 0.2 mL into the shoulder, thigh, or buttocks.        [DISCONTINUED] rosuvastatin (CRESTOR)  "40 MG tablet Take 1 tablet (40 mg total) by mouth at bedtime. 30 tablet 0     No facility-administered encounter medications on file as of 6/22/2020.      No past medical history on file.  Past Surgical History:   Procedure Laterality Date     AUGMENTATION MAMMAPLASTY Bilateral 1989     Social History     Tobacco Use     Smoking status: Never Smoker     Smokeless tobacco: Never Used   Substance Use Topics     Alcohol use: No     Drug use: No     Family History   Problem Relation Age of Onset     Diabetes Mother      Heart disease Mother      Hypertension Mother      Hyperlipidemia Mother      Osteoporosis Mother      Hyperlipidemia Father      Stroke Maternal Grandmother      Alzheimer's disease Sister      Social History     Social History Narrative     Not on file       Objective / Physical Examination:  Vitals:    06/22/20 1134   BP: 134/74   Pulse: 90   Resp: 20   SpO2: 96%   Weight: 188 lb (85.3 kg)   Height: 5' 3.75\" (1.619 m)     Wt Readings from Last 3 Encounters:   06/22/20 188 lb (85.3 kg)   11/04/19 185 lb 1.6 oz (84 kg)   04/01/19 177 lb (80.3 kg)     Body mass index is 32.52 kg/m .     General Appearance: Alert and oriented, cooperative, affect appropriate, speech clear, in no apparent distress  Head: Normocephalic, atraumatic  Eyes:Conjunctivae clear and sclerae non-icteric  Neck: Supple, trachea midline. No cervical adenopathy  Lungs: Clear to auscultation bilaterally. No adventitious lung sounds noted. Normal inspiratory and expiratory effort  Cardiovascular: Regular rate, normal S1, S2. No murmurs, rubs, or gallops  Extremities: Pulses 2+ and equal throughout. No edema. Strength equal throughout.  Left lateral hip pain, low back pain  Integumentary: Warm and dry.   Neuro: Alert and oriented, follows commands appropriately.     No results found.  No results found for this or any previous visit (from the past 240 hour(s)).        Kristel Perez, CNP  "

## 2021-06-10 NOTE — PROGRESS NOTES
Assessment/Plan:     1. Annual physical exam  2. HLD (hyperlipidemia)  - Lipid Profile  - Comprehensive Metabolic Panel   - Reviewed the importance of monitoring for changes in breast appearance such as nipple inversion, changes in breast tissue texture, non-healing wounds, or nipple discharge   - The following high BMI interventions were performed this visit: encouragement to exercise        Subjective:     Maria Elena Abreu is a 57 y.o. female who presents for an annual exam.     Sees a urologist for HRT for hot flashes, night sweats, and low libido associated with post-menopause. Started in .     HLD- no concerns regarding current treatment. Used to take lipitor but LDL was still very high.     The patient reports that there is not domestic violence in her life.     Healthy Habits:   Regular Exercise: No, has an elliptical, plans to use this more frequently for walking   Sunscreen Use: Yes  Healthy Diet: Yes  Dental Visits Regularly: Yes  Sexually active: Yes  Mammogram: 2017  Colonoscopy: Yes,       Immunization History   Administered Date(s) Administered     Hep B, historic 2010, 2010, 10/20/2010     Tdap 10/20/2010     ZOSTER 10/27/2014     Immunization status: UTD and documented      Gynecologic History  No LMP recorded. Patient is postmenopausal.  Contraception: post menopausal status  Last Pap: 2017, has annually by OB/GYN. Results were: normal      OB History    Para Term  AB SAB TAB Ectopic Multiple Living   3 3 3             # Outcome Date GA Lbr Sukhjinder/2nd Weight Sex Delivery Anes PTL Lv   3 Term            2 Term            1 Term                   Current Outpatient Prescriptions   Medication Sig Dispense Refill     aspirin 81 MG EC tablet Take 81 mg by mouth daily.       calcium-vitamin D 250-100 mg-unit per tablet Take 2 tablets by mouth 2 (two) times a day.       cholecalciferol, vitamin D3, 5,000 unit Tab Take 5,000 Units by mouth every other day.       coenzyme  Q10 100 mg capsule Take 300 mg by mouth daily.       Lactobacillus rhamnosus GG (CULTURELLE) 10-15 Billion cell capsule Take 1 capsule by mouth daily.       MULTIVITS-MIN/IRON/FA/LUTEIN (CENTRUM SILVER WOMEN ORAL) Take by mouth.       omega-3 fatty acids-vitamin E (FISH OIL) 1,000 mg cap Take 1,200 mg by mouth.       progesterone (PROMETRIUM) 100 MG capsule Take 100 mg by mouth 3 (three) times a day. 1 in the AM and 2 in the PM.       RESVERATROL ORAL Take 2 tablets by mouth 2 (two) times a day.       rosuvastatin (CRESTOR) 40 MG tablet TAKE 1 TABLET (40 MG TOTAL) BY MOUTH BEDTIME. 90 tablet 0     TESTOSTERONE IM Inject 0.2 mL into the shoulder, thigh, or buttocks.        No current facility-administered medications for this visit.      History reviewed. No pertinent past medical history.  Past Surgical History:   Procedure Laterality Date     AUGMENTATION MAMMAPLASTY  1989     Review of patient's allergies indicates no known allergies.  Family History   Problem Relation Age of Onset     Diabetes Mother      Heart disease Mother      Hypertension Mother      Hyperlipidemia Mother      Osteoporosis Mother      Hyperlipidemia Father      Stroke Maternal Grandmother      Social History     Social History     Marital status:      Spouse name: N/A     Number of children: 3     Years of education: N/A     Occupational History     Medical Assistant       Social History Main Topics     Smoking status: Never Smoker     Smokeless tobacco: Never Used     Alcohol use No     Drug use: No     Sexual activity: Yes     Other Topics Concern     Not on file     Social History Narrative       Review of Systems  General:  Negative except as noted above. Some occasional difficulty sleeping with associated fatigue the next day  Eyes: Negative except as noted above  Ears/Nose/Throat: Negative except as noted above  Cardiovascular: Negative except as noted above  Respiratory:  Negative except as noted above  Gastrointestinal:   "Negative except as noted above  Musculoskeletal:  Negative except as noted above  Skin: Negative except as noted above  Neurologic: Negative except as noted above  Psychiatric: Negative except as noted above  Endocrine: Negative except as noted above  Heme/Lymphatic: Negative except as noted above   Allergic/Immunologic: Negative except as noted above      Objective:      Vitals:    05/11/17 0949   BP: 118/82   Patient Site: Left Arm   Patient Position: Sitting   Cuff Size: Adult Regular   Pulse: 80   Weight: 187 lb 9.6 oz (85.1 kg)   Height: 5' 4\" (1.626 m)     Wt Readings from Last 3 Encounters:   05/11/17 187 lb 9.6 oz (85.1 kg)   02/17/17 190 lb (86.2 kg)   12/21/16 190 lb (86.2 kg)     Body mass index is 32.2 kg/(m^2).    Physical Exam:  General Appearance: Alert, cooperative, no distress.  Head: Normocephalic, without obvious abnormality, atraumatic  Eyes: PERRL, conjunctiva/corneas clear, EOM's intact  Ears: Normal TM's and external ear canals, both ears  Nose: Nares normal, septum midline,mucosa normal, no drainage  Throat: Lips, mucosa, and tongue normal  Neck: Supple, symmetrical, trachea midline, no adenopathy;  thyroid: not enlarged, symmetric, no tenderness/mass/nodules. Some hair growth on chin   Lungs: Clear to auscultation bilaterally, respirations unlabored  Breasts: deferred, done through OB/GYN office   Heart: Regular rate and rhythm, S1 and S2 normal, no murmur, rub, or gallop  Abdomen: Soft, non-tender, bowel sounds active all four quadrants,  no masses, no organomegaly  Extremities: Extremities normal, atraumatic, no cyanosis or edema  Skin: Skin color, texture, turgor normal, no rashes or lesions  Lymph nodes: Cervical, supraclavicular nodes normal  Neurologic: Normal  Psych: Normal affect.  Does not appear anxious or depressed.         "

## 2021-06-11 NOTE — PROGRESS NOTES
"Maria Elena Abreu is a 60 y.o. female who is being evaluated via a billable telephone visit.      The patient has been notified of following:     \"This telephone visit will be conducted via a call between you and your physician/provider. We have found that certain health care needs can be provided without the need for a physical exam.  This service lets us provide the care you need with a short phone conversation.  If a prescription is necessary we can send it directly to your pharmacy.  If lab work is needed we can place an order for that and you can then stop by our lab to have the test done at a later time.    Telephone visits are billed at different rates depending on your insurance coverage. During this emergency period, for some insurers they may be billed the same as an in-person visit.  Please reach out to your insurance provider with any questions.    If during the course of the call the physician/provider feels a telephone visit is not appropriate, you will not be charged for this service.\"    Patient has given verbal consent to a Telephone visit? Yes    What phone number would you like to be contacted at? [phone]    Patient would like to receive their AVS by AVS Preference: Alan.    Additional provider notes:   Assessment / Impression     1. Sore throat     2. Tonsillith           Plan:     Her description of symptoms sound similar to a tonsillolith.  During our phone conversation she used a Q-tip to see if she can remove it and she was able to do this successfully.  I recommended she gargle salt water and monitor symptoms.  She already reports that she is feeling a little better by the end of the appointment.    Subjective:      HPI: Maria Elena Abreu is a 60 y.o. female who is scheduled for telephone/virtual visit to discuss a distinct area soreness in her throat that she noticed last night after eating potato chips.  She can see a white dot that looks like a pimple in the back of her throat.  This is " where the area is sore.  She denies generalized sore throat symptoms.  She does not feel ill.  She is not having fevers.  She has a history of seasonal allergies, especially to ragweed.  She denies having coughing symptoms and she is not short of breath.        Review of Systems  All other systems reviewed and are negative.     Social History     Tobacco Use   Smoking Status Never Smoker   Smokeless Tobacco Never Used       Family History   Problem Relation Age of Onset     Diabetes Mother      Heart disease Mother      Hypertension Mother      Hyperlipidemia Mother      Osteoporosis Mother      Hyperlipidemia Father      Stroke Maternal Grandmother      Alzheimer's disease Sister        Objective:     There were no vitals taken for this visit.  She does not appear to be in acute distress.  She answers questions appropriately.    No results found for this or any previous visit (from the past 168 hour(s)).    Current Outpatient Medications   Medication Sig Note     aspirin 81 MG EC tablet Take 81 mg by mouth daily.      calcium-vitamin D 250-100 mg-unit per tablet Take 2 tablets by mouth 2 (two) times a day.      cholecalciferol, vitamin D3, 5,000 unit Tab Take 10,000 Units by mouth daily. 9/20/2017: x2 day     coenzyme Q10 100 mg capsule Take 300 mg by mouth daily.      MULTIVITS-MIN/IRON/FA/LUTEIN (CENTRUM SILVER WOMEN ORAL) Take by mouth.      omega-3 fatty acids-vitamin E (FISH OIL) 1,000 mg cap Take 1,200 mg by mouth 2 (two) times a day.      progesterone (PROMETRIUM) 100 MG capsule Take 100 mg by mouth 3 (three) times a day. 1 in the AM and 2 in the PM.      RESVERATROL ORAL Take 2 tablets by mouth 2 (two) times a day.      rosuvastatin (CRESTOR) 40 MG tablet Take 1 tablet (40 mg total) by mouth at bedtime.      TESTOSTERONE IM Inject 0.2 mL into the shoulder, thigh, or buttocks. About every 2 weeks.      TURMERIC ORAL Take by mouth daily.          Phone call duration: 8 minutes    Alex Rdz  DO

## 2021-06-12 NOTE — PATIENT INSTRUCTIONS - HE
Please follow up if you have any further issues.    You may contact me by phone or MyChart if you are worsening or if things are not improving.

## 2021-06-13 NOTE — PROGRESS NOTES
Internal Medicine Office Visit  [facility]  Patient Name: Maria Elena Abreu  Patient Age: 57 y.o.  YOB: 1960  MRN: [id number]    Date of Visit: 10/18/2017  Reason for Office Visit:   Chief Complaint   Patient presents with     Ear Pain     Lt ear, scratched it on saturday, is now swollen and hurts           Assessment / Plan / Medical Decision Makin. Otitis externa  - Left ear canal flushed with warm water to remove debris  - Start cortisporin ear drops, 3 drops TID x 10 days  - Follow up if symptoms are worsening or fail to improve. Could consider systemic steroid if needed for tragus/canal swelling       Health Maintenance Review  Health Maintenance   Topic Date Due     INFLUENZA VACCINE RULE BASED (1) 2017     MAMMOGRAM  2019     COLONOSCOPY  2019     PAP SMEAR  10/15/2019     TD 18+ HE  10/20/2020     ADVANCE DIRECTIVES DISCUSSED WITH PATIENT  10/21/2020     TDAP ADULT ONE TIME DOSE  Completed         I am having Ms. Abreu start on neomycin-polymyxin-hydrocortisone. I am also having her maintain her aspirin, MULTIVITS-MIN/IRON/FA/LUTEIN (CENTRUM SILVER WOMEN ORAL), progesterone, cholecalciferol (vitamin D3), coenzyme Q10, calcium-vitamin D, omega-3 fatty acids-vitamin E, Lactobacillus rhamnosus GG, TESTOSTERONE IM, RESVERATROL ORAL, and rosuvastatin.      HPI:  Maria Elena Abreu is a 57 y.o. year old who presents to the office today for left ear pain. Over the weekend she had an itch in the left ear and scratched the ear canal with a Q-tip but scratched the area with her fingernail. It is now swollen in the left ear canal and hard to hear but not painful other than a temporal headache. She started some polymixin ear drops.     She has had recurrent cellulitis in the right ear where there is a sore in the right ear. Not typically in the left ear.    Her sister has Alzheimers disease, father  from this.      Review of Systems:  As in  HPI. No fevers or chills       Current Scheduled Meds:  Outpatient Encounter Prescriptions as of 10/18/2017   Medication Sig Dispense Refill     aspirin 81 MG EC tablet Take 81 mg by mouth daily.       calcium-vitamin D 250-100 mg-unit per tablet Take 2 tablets by mouth 2 (two) times a day.       cholecalciferol, vitamin D3, 5,000 unit Tab Take 10,000 Units by mouth daily.       coenzyme Q10 100 mg capsule Take 300 mg by mouth daily.       Lactobacillus rhamnosus GG (CULTURELLE) 10-15 Billion cell capsule Take 1 capsule by mouth 2 (two) times a day.       MULTIVITS-MIN/IRON/FA/LUTEIN (CENTRUM SILVER WOMEN ORAL) Take by mouth.       omega-3 fatty acids-vitamin E (FISH OIL) 1,000 mg cap Take 1,200 mg by mouth 2 (two) times a day.       progesterone (PROMETRIUM) 100 MG capsule Take 100 mg by mouth 3 (three) times a day. 1 in the AM and 2 in the PM.       RESVERATROL ORAL Take 2 tablets by mouth 2 (two) times a day.       rosuvastatin (CRESTOR) 40 MG tablet Take 1 tablet (40 mg total) by mouth at bedtime. 90 tablet 3     TESTOSTERONE IM Inject 0.2 mL into the shoulder, thigh, or buttocks.        neomycin-polymyxin-hydrocortisone (CORTISPORIN) otic solution Administer 3 drops into the left ear 3 (three) times a day for 10 days. 10 mL 0     No facility-administered encounter medications on file as of 10/18/2017.      History reviewed. No pertinent past medical history.  Past Surgical History:   Procedure Laterality Date     AUGMENTATION MAMMAPLASTY  1989     Social History   Substance Use Topics     Smoking status: Never Smoker     Smokeless tobacco: Never Used     Alcohol use No       Objective / Physical Examination:  Vitals:    10/18/17 0942   BP: 104/70   Temp: 98.4  F (36.9  C)   TempSrc: Oral   Weight: 179 lb (81.2 kg)     Wt Readings from Last 3 Encounters:   10/18/17 179 lb (81.2 kg)   09/20/17 184 lb 1.6 oz (83.5 kg)   05/11/17 187 lb 9.6 oz (85.1 kg)     Body mass index is 30.73 kg/(m^2).     General Appearance:  Alert and oriented, cooperative, affect appropriate, speech clear, in no apparent distress  Ears: Tympanic membrane clear with landmarks well visualized right ear. Left ear canal with light yellow colored adherent drainage. Tragus is swollen. There is a small fissure at the top of the auditory meatus       No orders of the defined types were placed in this encounter.  Followup: Return if symptoms worsen or fail to improve. earlier if needed.        Teagan Moreno, CNP

## 2021-06-13 NOTE — PROGRESS NOTES
Assessment:      Viral pharyngitis.      Plan:     Use of OTC analgesics recommended as well as throat lozenges  Follow up as needed    Subjective:       History was provided by the patient.  Maria Elena Abreu is a 57 y.o. female who presents for evaluation of a sore throat. Associated symptoms include headache, myalgias, pain while swallowing, sore throat and tender neck. Onset of symptoms was 4 days ago, gradually improving since that time.  She is drinking plenty of fluids. She has had recent close exposure to someone with proven streptococcal pharyngitis.  The following portions of the patient's history were reviewed and updated as appropriate: past family history, past medical history, past social history and past surgical history.    Review of Systems  Pertinent items are noted in HPI.      Objective:      General appearance: alert, appears stated age and cooperative  Head: Normocephalic, without obvious abnormality, atraumatic  Ears: normal TM's and external ear canals both ears  Nose: Nares normal. Septum midline. Mucosa normal. No drainage or sinus tenderness.  Throat: abnormal findings: no exudate present, pharynx erythematous  Neck: marked anterior cervical adenopathy and supple, symmetrical, trachea midline  Lungs: clear to auscultation bilaterally  Heart: regular rate and rhythm, S1, S2 normal, no murmur, click, rub or gallop

## 2021-06-16 PROBLEM — Z82.0 FAMILY HISTORY OF ALZHEIMER'S DISEASE: Status: ACTIVE | Noted: 2017-10-18

## 2021-06-16 NOTE — PROGRESS NOTES
[facility]- Dr. Last Madsen MD. Manages post menopausal HRT       Assessment/Plan:     1. Annual physical exam  2. Vitamin D deficiency  3. HLD (hyperlipidemia)  4. Obesity (BMI 30-39.9)  - Reviewed the importance of monitoring for changes in breast appearance such as nipple inversion, changes in breast tissue texture, non-healing wounds, or nipple discharge   - The following high BMI interventions were performed this visit: encouragement to exercise and dietary management education, guidance, and counseling. Prescribed phentermine. Risks and benefits reviewed with this medication. Follow up in 3 months for weight recheck.   - Fasting labs today  - Annual physical         Subjective:     Maria Elena Abreu is a 57 y.o. female who presents for an annual exam.     Vitamin D deficiency- high dose in the winter 10,000 IU daily, 5000 in the summer months.     She has noted more flatulence recently. No dietary changes that she recalls. Bowel habits are unchanged.     We reviewed her recent weight gain. She does tend to overeat and has a hard time with self-control and stress eating. 8 years ago she was in the 150 lb range.     The patient reports that there is not domestic violence in her life.     Healthy Habits:   Regular Exercise: No, she has a treadmill. 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   Sunscreen Use: Yes  Healthy Diet: Yes  Dental Visits Regularly: Yes  Mammogram: 2017   Colonoscopy: Yes,       Immunization History   Administered Date(s) Administered     Hep B, historic 2010, 2010, 10/20/2010     Influenza, inj, historic,unspecified 10/05/2017     Tdap 10/20/2010     ZOSTER, LIVE 10/27/2014     Immunization status: UTD     Gynecologic History  No LMP recorded. Patient is postmenopausal.  Last Pap: 1 year ago. Results were: normal     OB History    Para Term  AB Living   3 3 3      SAB TAB Ectopic Multiple Live Births              # Outcome Date GA Lbr Sukhjinder/2nd Weight Sex Delivery Anes PTL Lv   3 Term            2 Term            1 Term                   Current Outpatient Prescriptions   Medication Sig Dispense Refill     aspirin 81 MG EC tablet Take 81 mg by mouth daily.       calcium-vitamin D 250-100 mg-unit per tablet Take 2 tablets by mouth 2 (two) times a day.       cholecalciferol, vitamin D3, 5,000 unit Tab Take 10,000 Units by mouth daily.       coenzyme Q10 100 mg capsule Take 300 mg by mouth daily.       Lactobacillus rhamnosus GG (CULTURELLE) 10-15 Billion cell capsule Take 1 capsule by mouth 2 (two) times a day.       MULTIVITS-MIN/IRON/FA/LUTEIN (CENTRUM SILVER WOMEN ORAL) Take by mouth.       omega-3 fatty acids-vitamin E (FISH OIL) 1,000 mg cap Take 1,200 mg by mouth 2 (two) times a day.       progesterone (PROMETRIUM) 100 MG capsule Take 100 mg by mouth 3 (three) times a day. 1 in the AM and 2 in the PM.       RESVERATROL ORAL Take 2 tablets by mouth 2 (two) times a day.       rosuvastatin (CRESTOR) 40 MG tablet Take 1 tablet (40 mg total) by mouth at bedtime. 90 tablet 3     TESTOSTERONE IM Inject 0.2 mL into the shoulder, thigh, or buttocks.        phentermine (ADIPEX-P) 37.5 mg tablet Take 1/2 tablet daily x 1 week then increase to 1 tablet daily 30 tablet 0     No current facility-administered medications for this visit.      History reviewed. No pertinent past medical history.  Past Surgical History:   Procedure Laterality Date     AUGMENTATION MAMMAPLASTY  1989     Review of patient's allergies indicates no known allergies.  Family History   Problem Relation Age of Onset     Diabetes Mother      Heart disease Mother      Hypertension Mother      Hyperlipidemia Mother      Osteoporosis Mother      Hyperlipidemia Father      Stroke Maternal Grandmother      Alzheimer's disease Sister      Social History     Social History     Marital status:      Spouse name: N/A     Number of children: 3     Years of  "education: N/A     Occupational History     Medical Assistant       Social History Main Topics     Smoking status: Never Smoker     Smokeless tobacco: Never Used     Alcohol use No     Drug use: No     Sexual activity: Yes     Other Topics Concern     Not on file     Social History Narrative       Review of Systems  General:  Negative except as noted above  Eyes: Negative except as noted above  Ears/Nose/Throat: Negative except as noted above  Cardiovascular: Negative except as noted above  Respiratory:  Negative except as noted above  Gastrointestinal:  Negative except as noted above  Musculoskeletal:  Negative except as noted above  Skin: Negative except as noted above  Neurologic: Negative except as noted above  Psychiatric: Negative except as noted above  Endocrine: Negative except as noted above  Heme/Lymphatic: Negative except as noted above   Allergic/Immunologic: Negative except as noted above      Objective:      Vitals:    02/26/18 1001   BP: 120/70   Pulse: 76   Weight: 184 lb (83.5 kg)   Height: 5' 4\" (1.626 m)     Wt Readings from Last 3 Encounters:   02/26/18 184 lb (83.5 kg)   10/26/17 180 lb 9.6 oz (81.9 kg)   10/18/17 179 lb (81.2 kg)     Body mass index is 31.58 kg/(m^2).     Physical Exam:  General Appearance: Alert, cooperative, no distress.  Head: Normocephalic, without obvious abnormality, atraumatic  Eyes: PERRL, conjunctiva/corneas clear, EOM's intact  Ears: Normal TM's and external ear canals, both ears  Throat: Lips, mucosa, and tongue normal  Neck: Supple, symmetrical, trachea midline, no adenopathy;  thyroid: not enlarged, symmetric, no tenderness/mass/nodules  Lungs: Clear to auscultation bilaterally, respirations unlabored  Heart: Regular rate and rhythm, S1 and S2 normal, no murmur, rub, or gallop  Abdomen: Soft, non-tender, bowel sounds active all four quadrants,  no masses, no organomegaly  Extremities: Extremities normal, atraumatic, no cyanosis or edema  Skin: Skin color, texture, " turgor normal, no rashes or lesions  Lymph nodes: Cervical, supraclavicular nodes normal  Neurologic: Normal  Psych: Normal affect.  Does not appear anxious or depressed.

## 2021-06-16 NOTE — PROGRESS NOTES
Internal Medicine Office Visit  [facility]  Patient Name: Maria Elena Abreu  Patient Age: 57 y.o.  YOB: 1960  MRN: [id number]    Date of Visit: 2018  Reason for Office Visit:   Chief Complaint   Patient presents with     Ear Pain     Rt ear           Assessment / Plan / Medical Decision Makin. Dermatitis of right ear canal  - Use small amount of cortisone cream on a q-tip to gently apply to the ear canal for itching. If she develops pain she will start Cortisporin ear drops as directed. Call if pain worsens or fails to improve, will call in oral antibiotic which she has required in the past with treatment failure     Health Maintenance Review  Health Maintenance   Topic Date Due     MAMMOGRAM  2019     COLONOSCOPY  2019     PAP SMEAR  10/15/2019     TD 18+ HE  10/20/2020     ADVANCE DIRECTIVES DISCUSSED WITH PATIENT  10/21/2020     INFLUENZA VACCINE RULE BASED  Completed     TDAP ADULT ONE TIME DOSE  Completed         I am having Ms. Abreu start on neomycin-polymyxin-hydrocortisone. I am also having her maintain her aspirin, MULTIVITS-MIN/IRON/FA/LUTEIN (CENTRUM SILVER WOMEN ORAL), progesterone, cholecalciferol (vitamin D3), coenzyme Q10, calcium-vitamin D, omega-3 fatty acids-vitamin E, Lactobacillus rhamnosus GG, TESTOSTERONE IM, RESVERATROL ORAL, rosuvastatin, and phentermine.      HPI:  Maria Elena Abreu is a 57 y.o. year old who presents to the office today for right ear pain. She has a history of recurrent otitis externa in the past. Monday of this week she started to have pain in the right ear. No discharge, fevers, chills. She had an antibiotic drop leftover from a prior ear infection and started this yesterday. Her ear pain feels slightly better today.         Review of Systems- pertinent positive in bold:  A 10-point ROS is negative except as stated in HPI         Current Scheduled Meds:  Outpatient Encounter Prescriptions as of  2/28/2018   Medication Sig Dispense Refill     aspirin 81 MG EC tablet Take 81 mg by mouth daily.       calcium-vitamin D 250-100 mg-unit per tablet Take 2 tablets by mouth 2 (two) times a day.       cholecalciferol, vitamin D3, 5,000 unit Tab Take 10,000 Units by mouth daily.       coenzyme Q10 100 mg capsule Take 300 mg by mouth daily.       Lactobacillus rhamnosus GG (CULTURELLE) 10-15 Billion cell capsule Take 1 capsule by mouth 2 (two) times a day.       MULTIVITS-MIN/IRON/FA/LUTEIN (CENTRUM SILVER WOMEN ORAL) Take by mouth.       omega-3 fatty acids-vitamin E (FISH OIL) 1,000 mg cap Take 1,200 mg by mouth 2 (two) times a day.       phentermine (ADIPEX-P) 37.5 mg tablet Take 1/2 tablet daily x 1 week then increase to 1 tablet daily 30 tablet 0     progesterone (PROMETRIUM) 100 MG capsule Take 100 mg by mouth 3 (three) times a day. 1 in the AM and 2 in the PM.       RESVERATROL ORAL Take 2 tablets by mouth 2 (two) times a day.       rosuvastatin (CRESTOR) 40 MG tablet Take 1 tablet (40 mg total) by mouth at bedtime. 90 tablet 3     TESTOSTERONE IM Inject 0.2 mL into the shoulder, thigh, or buttocks.        neomycin-polymyxin-hydrocortisone (CORTISPORIN) otic solution Administer 3 drops to the right ear 3 (three) times a day for 10 days. 10 mL 0     No facility-administered encounter medications on file as of 2/28/2018.      History reviewed. No pertinent past medical history.  Past Surgical History:   Procedure Laterality Date     AUGMENTATION MAMMAPLASTY  1989     Social History   Substance Use Topics     Smoking status: Never Smoker     Smokeless tobacco: Never Used     Alcohol use No       Objective / Physical Examination:  Vitals:    02/28/18 1405   BP: 120/70   Pulse: 68   Temp: 98.4  F (36.9  C)   TempSrc: Oral   Weight: 183 lb (83 kg)     Wt Readings from Last 3 Encounters:   02/28/18 183 lb (83 kg)   02/26/18 184 lb (83.5 kg)   10/26/17 180 lb 9.6 oz (81.9 kg)     Body mass index is 31.41 kg/(m^2).      General Appearance: Alert and oriented, cooperative, affect appropriate, speech clear, in no apparent distress  Head: Normocephalic, atraumatic  Ears: Tympanic membrane clear with landmarks well visualized bilaterally. Right TM with mild erythema at the 6:00 position of the ear canal. No swelling/edema/discharge   Eyes: PERRL      No orders of the defined types were placed in this encounter.  Followup: Return if symptoms worsen or fail to improve. earlier if needed.        Teagan Moreno, CNP

## 2021-06-17 NOTE — PROGRESS NOTES
Internal Medicine Office Visit  [facility]  Patient Name: Maria Elena Abreu  Patient Age: 57 y.o.  YOB: 1960  MRN: [id number]    Date of Visit: 2018  Reason for Office Visit:   Chief Complaint   Patient presents with     Weight Check           Assessment / Plan / Medical Decision Makin. Overweight  2. Weight loss counseling, encounter for  -  Continue Phentermine until August, may call monthly for refills  - Advised that she add weight resistance exercise in addition to cardio exercise  - Continue with dietary changes  - follow up as needed or August if she wishes to discuss weight loss further. Otherwise, annual physical in February      Health Maintenance Review  Health Maintenance   Topic Date Due     COLONOSCOPY  2019     PAP SMEAR  10/15/2019     MAMMOGRAM  2020     TD 18+ HE  10/20/2020     ADVANCE DIRECTIVES DISCUSSED WITH PATIENT  10/21/2020     INFLUENZA VACCINE RULE BASED  Completed     TDAP ADULT ONE TIME DOSE  Completed         I have changed Ms. Abreu's phentermine. I am also having her maintain her aspirin, MULTIVITS-MIN/IRON/FA/LUTEIN (CENTRUM SILVER WOMEN ORAL), progesterone, cholecalciferol (vitamin D3), coenzyme Q10, calcium-vitamin D, omega-3 fatty acids-vitamin E, Lactobacillus rhamnosus GG, TESTOSTERONE IM, RESVERATROL ORAL, and rosuvastatin.      HPI:  Maria Elena Abreu is a 57 y.o. year old who presents to the office today for follow up of weight.     Down 11 pounds  No palpitations, jitteriness associated with phentermine medication.     Diet- cutting back on breads, more fruits/veggies   Exercise- walks 5 miles at work. Does [employer].     Review of Systems- pertinent positive in bold:  A 10-point ROS is negative except as stated in HPI       Current Scheduled Meds:  Outpatient Encounter Prescriptions as of 2018   Medication Sig Dispense Refill     aspirin 81 MG EC tablet Take 81 mg by mouth daily.       calcium-vitamin  "D 250-100 mg-unit per tablet Take 2 tablets by mouth 2 (two) times a day.       cholecalciferol, vitamin D3, 5,000 unit Tab Take 10,000 Units by mouth daily.       coenzyme Q10 100 mg capsule Take 300 mg by mouth daily.       Lactobacillus rhamnosus GG (CULTURELLE) 10-15 Billion cell capsule Take 1 capsule by mouth 2 (two) times a day.       MULTIVITS-MIN/IRON/FA/LUTEIN (CENTRUM SILVER WOMEN ORAL) Take by mouth.       omega-3 fatty acids-vitamin E (FISH OIL) 1,000 mg cap Take 1,200 mg by mouth 2 (two) times a day.       progesterone (PROMETRIUM) 100 MG capsule Take 100 mg by mouth 3 (three) times a day. 1 in the AM and 2 in the PM.       RESVERATROL ORAL Take 2 tablets by mouth 2 (two) times a day.       rosuvastatin (CRESTOR) 40 MG tablet Take 1 tablet (40 mg total) by mouth at bedtime. 90 tablet 3     TESTOSTERONE IM Inject 0.2 mL into the shoulder, thigh, or buttocks.        [DISCONTINUED] phentermine (ADIPEX-P) 37.5 mg tablet Take 1 tablet (37.5 mg total) by mouth daily before breakfast. 1 tablet daily 30 tablet 0     phentermine (ADIPEX-P) 37.5 mg tablet Take 1 tablet (37.5 mg total) by mouth daily before breakfast. For 5/9-6/8 30 tablet 0     No facility-administered encounter medications on file as of 4/26/2018.      No past medical history on file.  Past Surgical History:   Procedure Laterality Date     AUGMENTATION MAMMAPLASTY Bilateral 1989     Social History   Substance Use Topics     Smoking status: Never Smoker     Smokeless tobacco: Never Used     Alcohol use No       Objective / Physical Examination:  Vitals:    04/26/18 1501   BP: 110/60   Pulse: 68   Weight: 173 lb (78.5 kg)   Height: 5' 4\" (1.626 m)     Wt Readings from Last 3 Encounters:   04/26/18 173 lb (78.5 kg)   02/28/18 183 lb (83 kg)   02/26/18 184 lb (83.5 kg)     Body mass index is 29.7 kg/(m^2).     General Appearance: Alert and oriented, cooperative, affect appropriate, speech clear, in no apparent distress  Cardiovascular: Regular " rate, normal S1, S2. No murmurs, rubs, or gallops      No orders of the defined types were placed in this encounter.  Followup: Return for Annual physical. earlier if needed.         Teagan Moreno, CNP

## 2021-06-23 ENCOUNTER — OFFICE VISIT - HEALTHEAST (OUTPATIENT)
Dept: INTERNAL MEDICINE | Facility: CLINIC | Age: 61
End: 2021-06-23

## 2021-06-23 NOTE — PROGRESS NOTES
Assessment       1. Urinary tract infection without hematuria, site unspecified  Patient is encouraged to push water, wipe front to back, and urinate after intercourse.  - Urinalysis-UC if Indicated  - sulfamethoxazole-trimethoprim (BACTRIM DS) 800-160 mg per tablet; Take 1 tablet by mouth 2 (two) times a day for 5 days.  Dispense: 10 tablet; Refill: 0  - fluconazole (DIFLUCAN) 150 MG tablet; Take 1 tablet (150 mg total) by mouth once for 1 dose.  Dispense: 2 tablet; Refill: 0, prophylactic  - Culture, Urine    Patient advised if symptoms persist, worsen or new symptoms arise they are to seek medical care.  All patients questions addressed. Patient verbalized understanding and agreement with plan.       Plan:     There are no Patient Instructions on file for this visit.  I have discontinued Maria Elena Abreu's phentermine and phentermine. I am also having her start on sulfamethoxazole-trimethoprim and fluconazole. Additionally, I am having her maintain her aspirin, MULTIVITS-MIN/IRON/FA/LUTEIN (CENTRUM SILVER WOMEN ORAL), progesterone, cholecalciferol (vitamin D3), coenzyme Q10, calcium-vitamin D, omega-3 fatty acids-vitamin E, Lactobacillus rhamnosus GG, TESTOSTERONE IM, RESVERATROL ORAL, and rosuvastatin.  Orders Placed This Encounter   Procedures     Culture, Urine     Urinalysis-UC if Indicated   Followup: Return in about 3 days (around 2/9/2019). earlier if needed.  Subjective:      HPI: Maria Elena Abreu is a 58 y.o. female presents to clinic todayWith a chief complaint of 5-day history of intermittent dysuria, urgency, frequency without hematuria or incontinence.  Patient does report some lower pelvic discomfort over the bladder though no back pain.  Patient denies any fever or chills she does report some itching which is intermittent.    No past medical history on file.  Past Surgical History:   Procedure Laterality Date     AUGMENTATION MAMMAPLASTY Bilateral 1989     Ragweed pollen  Outpatient Medications Prior  to Visit   Medication Sig Dispense Refill     aspirin 81 MG EC tablet Take 81 mg by mouth daily.       calcium-vitamin D 250-100 mg-unit per tablet Take 2 tablets by mouth 2 (two) times a day.       cholecalciferol, vitamin D3, 5,000 unit Tab Take 10,000 Units by mouth daily.       coenzyme Q10 100 mg capsule Take 300 mg by mouth daily.       Lactobacillus rhamnosus GG (CULTURELLE) 10-15 Billion cell capsule Take 1 capsule by mouth 2 (two) times a day.       MULTIVITS-MIN/IRON/FA/LUTEIN (CENTRUM SILVER WOMEN ORAL) Take by mouth.       omega-3 fatty acids-vitamin E (FISH OIL) 1,000 mg cap Take 1,200 mg by mouth 2 (two) times a day.       progesterone (PROMETRIUM) 100 MG capsule Take 100 mg by mouth 3 (three) times a day. 1 in the AM and 2 in the PM.       RESVERATROL ORAL Take 2 tablets by mouth 2 (two) times a day.       rosuvastatin (CRESTOR) 40 MG tablet TAKE ONE TABLET BY MOUTH AT BEDTIME  90 tablet 2     TESTOSTERONE IM Inject 0.2 mL into the shoulder, thigh, or buttocks.        phentermine (ADIPEX-P) 37.5 mg tablet Take 1 tablet (37.5 mg total) by mouth daily before breakfast. For 5/9-6/8 30 tablet 0     phentermine (ADIPEX-P) 37.5 mg tablet TAKE ONE TABLET BY MOUTH ONE TIME DAILY BEFORE BREAKFAST 30 tablet 0     No facility-administered medications prior to visit.      Family History   Problem Relation Age of Onset     Diabetes Mother      Heart disease Mother      Hypertension Mother      Hyperlipidemia Mother      Osteoporosis Mother      Hyperlipidemia Father      Stroke Maternal Grandmother      Alzheimer's disease Sister      Social History     Social History Narrative     Not on file     Patient Active Problem List   Diagnosis     HLD (hyperlipidemia)     Menopause     Family history of Alzheimer's disease       Review of Systems  Gen: No fever or fatigue  Eyes: No eye discharge.   ENT: No nasal congestion. No pharyngitis. No otalgia.  Resp: No SOB, cough or wheezing.  GI:No diarrhea, nausea or vomiting.  "No constipation.  : See HPI  MS: No joint/bone/muscle tenderness.  Skin: No rashes  Neuro: No headaches. Normal  Lymph/Hematologic: No gland swelling        Objective:     Vitals:    02/06/19 1145   BP: 118/64   Pulse: 92   Temp: 98.2  F (36.8  C)   SpO2: 96%   Weight: 174 lb (78.9 kg)   Height: 5' 4\" (1.626 m)       Physical Exam:   Gen - Alert, no acute distress.   HEENT - conjunctivae are clear,   Nose is clear.  Oropharynx is moist and clear, without tonsillar hypertrophy, asymmetry, exudate or lesions.  Neck - supple without adenopathy or thyromegaly.  Lungs - have good air entry bilaterally, no wheezes or crackles.  No prolongation of expiratory phase.   No tachypnea, retractions, or increased work of breathing.  Cardiac - regular rate and rhythm, normal S1 and S2.  Abdomen - soft mild tenderness noted upon palpation over the bladder  Back- no CVA tenderness  Skin - clear without rash        No results found.  Recent Results (from the past 240 hour(s))   Urinalysis-UC if Indicated   Result Value Ref Range    Color, UA Yellow Colorless, Yellow, Straw, Light Yellow    Clarity, UA Slightly Cloudy (!) Clear    Glucose, UA Negative Negative    Bilirubin, UA Negative Negative    Ketones, UA Negative Negative    Specific Gravity, UA 1.015 1.005 - 1.030    Blood, UA Moderate (!) Negative    pH, UA 5.5 5.0 - 8.0    Protein, UA 30 mg/dL (!) Negative mg/dL    Urobilinogen, UA 0.2 E.U./dL 0.2 E.U./dL, 1.0 E.U./dL    Nitrite, UA Positive (!) Negative    Leukocytes, UA Large (!) Negative    Bacteria, UA Many (!) None Seen hpf    RBC, UA 3-5 (!) None Seen, 0-2 hpf    WBC, UA  (!) None Seen, 0-5 hpf    Squam Epithel, UA 0-5 None Seen, 0-5 lpf    WBC Clumps Present (!) None Seen         Kristel Perez, CNP  "

## 2021-06-24 NOTE — TELEPHONE ENCOUNTER
Who is calling:  Patient  Reason for Call:  Patient stated that she had her flu shot on 10/4/18 at [facility]. Patient stated that she is getting notifications via her mychart to get her flu shot, but she already received it. Patient is wondering if she had her flu shot at [facility] why is it not in her chart.  Date of last appointment with primary care: 4/26/18  Has the patient been recently seen:  Yes  Okay to leave a detailed message: No

## 2021-06-25 NOTE — PROGRESS NOTES
Progress Notes by John Kemp at 10/26/2017  8:20 AM     Author: John Kemp Service: -- Author Type: Nurse Practitioner    Filed: 10/26/2017  3:37 PM Encounter Date: 10/26/2017 Status: Signed    : John Kemp [facility] Medicine[facility]    Date of Service: 10/26/2017  Primary Provider: LENI Barger    Patient Care Team:  LENI Murillo as PCP - General (Nurse Practitioner)     ______________________________________________________________________     Patient's Pharmacy:    [facility]/pharmacy #[id number] [address] [address]  Phone: [phone] Fax: [phone]     Patient's Insurance:    Payor: [employer] EMPLOYEE / Plan: [employer] EMP PLAN MEDICA PASSPORT / Product Type: PPO/POS/FFS /     ______________________________________________________________________     Assessment:    1. Follow up    2. Otitis externa       ______________________________________________________________________    PHQ-2 Total Score: 0 (5/11/2017  9:00 AM)  Depression Follow-up Plan: patient follow-up to return when and if necessary (5/11/2017  9:00 AM)       Plan:  Patient Instructions   1. Medications prescribed today:  - cephalexin (KEFLEX) 500 MG capsule; Take 1 capsule (500 mg total) by mouth 3 (three) times a day for 10 days.  Dispense: 30 capsule; Refill: 0    2. May try Claritin D or Zyrtec D for runny nose and ear congestion with this.    3. OK to use the Flonase nasal spray as previously directed if the above is not improving your symptoms.         ______________________________________________________________________     Maria Elenalaurie Abreu is 57 y.o. female who comes in today for:  Chief Complaint   Patient presents with   ? Follow-up     ear infection patient stated its not getting better. was given drops by [place] but patient thinks thats not working.       ______________________________________________________________________     Patient Active Problem List   Diagnosis   ? HLD (hyperlipidemia)   ? Menopause   ? Family history of Alzheimer's disease     No past medical history on file.    Past Surgical History:   Procedure Laterality Date   ? AUGMENTATION MAMMAPLASTY  1989     No Known Allergies     Current Outpatient Prescriptions   Medication Sig Note   ? aspirin 81 MG EC tablet Take 81 mg by mouth daily.    ? calcium-vitamin D 250-100 mg-unit per tablet Take 2 tablets by mouth 2 (two) times a day.    ? cholecalciferol, vitamin D3, 5,000 unit Tab Take 10,000 Units by mouth daily. 9/20/2017: x2 day   ? coenzyme Q10 100 mg capsule Take 300 mg by mouth daily.    ? Lactobacillus rhamnosus GG (CULTURELLE) 10-15 Billion cell capsule Take 1 capsule by mouth 2 (two) times a day. 9/20/2017: 1 am 1pm   ? MULTIVITS-MIN/IRON/FA/LUTEIN (CENTRUM SILVER WOMEN ORAL) Take by mouth.    ? neomycin-polymyxin-hydrocortisone (CORTISPORIN) otic solution Administer 3 drops into the left ear 3 (three) times a day for 10 days.    ? omega-3 fatty acids-vitamin E (FISH OIL) 1,000 mg cap Take 1,200 mg by mouth 2 (two) times a day.    ? predniSONE (DELTASONE) 20 MG tablet Take 2 tablets (40 mg total) by mouth daily for 4 days.    ? progesterone (PROMETRIUM) 100 MG capsule Take 100 mg by mouth 3 (three) times a day. 1 in the AM and 2 in the PM.    ? RESVERATROL ORAL Take 2 tablets by mouth 2 (two) times a day.    ? rosuvastatin (CRESTOR) 40 MG tablet Take 1 tablet (40 mg total) by mouth at bedtime.    ? TESTOSTERONE IM Inject 0.2 mL into the shoulder, thigh, or buttocks.     ? cephalexin (KEFLEX) 500 MG capsule Take 1 capsule (500 mg total) by mouth 3 (three) times a day for 10 days.       ______________________________________________________________________     History of present illness: Maria Elena Koier is a pleasant 57 y.o. female with a PMH pertinent for HLD who presents in clinic today for  follow up left external ear infection.  She was seen on 10/18/2017 by Teagan Moreno NP for left ear infection that had developed approximately 1 week prior to being seen.  She was placed on cortisporin otic drops which have not been effective.  Bilateral ear plugging and pressure noted today as well as postnasal drip and rhinorrhea - clear all this week.    Review of systems:   On ROS, the patient denies fevers, sweats, sore throat, cough, wheeze, chest pain, shortness of breath.  Positive for symptoms as noted in the HPI.  ______________________________________________________________________     /78 (Patient Site: Right Arm, Patient Position: Sitting, Cuff Size: Adult Regular)  Pulse 80  Wt 180 lb 9.6 oz (81.9 kg)  BMI 31 kg/m2    Exam:  Patient is comfortable, no apparent distress.  Mood good.  Insight good.  Ears - Right TM and canal are normal; Left TM is normal, canal is mildly erythematous and still noted small fissure at the top of auditory meatus, trace dried blood and scant yellowish drainage also noted.  Nose exam shows no rhinitis.  Throat - no erythema, exudate, or thrush.  Neck is supple, there is no cervical adenopathy.  No thyromegaly.  Heart regular rate and rhythm.  Lungs are clear to auscultation bilaterally.  Respiratory effort is good.       John Kemp, ROWDY  Internal Medicine  [facility]

## 2021-06-26 ENCOUNTER — HEALTH MAINTENANCE LETTER (OUTPATIENT)
Age: 61
End: 2021-06-26

## 2021-06-29 NOTE — PROGRESS NOTES
Progress Notes by Markie Staton MD at 10/2/2020 11:45 AM     Author: Markie Staton MD Service: -- Author Type: Physician    Filed: 10/5/2020  8:17 AM Encounter Date: 10/2/2020 Status: Signed    : Markie Staton MD (Physician)                [facility]    Comprehensive and complex medical care - Chronic disease management - Shared decision making - Care coordination - Compassionate care    Patient advocacy - Rational deprescribing - Minimally disruptive medicine - Ethical focus - Customized care          Date of Service: 10/2/2020  Primary Provider: Kristel Perez CNP    Patient Care Team:  Kristel Perez CNP as PCP - General (Nurse Practitioner)  Kristel Perez CNP as Assigned PCP     ______________________________________________________________________     Patient's Pharmacy:      [facility] - [place], [place] - [address] [place] [address]  [address] [place] [id number]  Phone: [phone] Fax: [phone]     Patient's Contacts:  Name Home Phone Work Phone Mobile Phone Relationship Lgl Grd   NEVILLE ABREU [phone] Spouse        Patient's Insurance:    Payor/Plan Subscr  Sex Relation Sub. Ins. ID Effective Group Num   1. [employer]* CIELO ABREU 1960 Female  [id number] Not Eff [id number]                                   [address] [address]   2. [employer] ONE* CIELO ABREU 1960 Female Self [id number]                                   [address] [address]            Cielo Abreu is a 60 y.o. female who comes in today for:    Chief Complaint   Patient presents with   ? Rectal Bleeding     x 3 days bright red, even if does not have a  BM       Current Outpatient Medications   Medication Sig Note   ? aspirin 81 MG EC tablet Take 81 mg by mouth daily.    ? calcium-vitamin D 250-100 mg-unit per tablet Take 2 tablets by mouth 2 (two) times a day.    ? cholecalciferol,  vitamin D3, 5,000 unit Tab Take 10,000 Units by mouth daily. 9/20/2017: x2 day   ? coenzyme Q10 100 mg capsule Take 300 mg by mouth daily.    ? MULTIVITS-MIN/IRON/FA/LUTEIN (CENTRUM SILVER WOMEN ORAL) Take by mouth.    ? omega-3 fatty acids-vitamin E (FISH OIL) 1,000 mg cap Take 1,200 mg by mouth 2 (two) times a day.    ? progesterone (PROMETRIUM) 100 MG capsule Take 100 mg by mouth 3 (three) times a day. 1 in the AM and 2 in the PM.    ? RESVERATROL ORAL Take 2 tablets by mouth 2 (two) times a day.    ? rosuvastatin (CRESTOR) 40 MG tablet Take 1 tablet (40 mg total) by mouth at bedtime.    ? TESTOSTERONE IM Inject 0.2 mL into the shoulder, thigh, or buttocks. About every 2 weeks.    ? TURMERIC ORAL Take by mouth daily.        Subjective:      Patient comes in today for issues with rectal bleeding and discomfort.  She has had this for about 3 days.    She has a history of rectal fissure.  She had colonoscopy in 2019 which showed some internal hemorrhoids.  It was otherwise good except for a singular tubular adenoma.    Her  was diagnosed in the recent past with colon cancer which spread to his liver.  This obviously causes her some concern.    Her last visit with Kirstel Perez did mention a single episode of blood in the toilet.    She has been using some preparation H with this.  She has used some MiraLAX as well.  She denies any bulging from the rectal area.  It does hurt when she passes her stools.    On review of systems, the patient has no fever.      Objective:     Wt Readings from Last 3 Encounters:   10/02/20 192 lb (87.1 kg)   06/22/20 188 lb (85.3 kg)   11/04/19 185 lb 1.6 oz (84 kg)     BP Readings from Last 3 Encounters:   10/02/20 124/74   06/22/20 134/74   11/04/19 127/81      /74   Pulse 89   Temp 98.6  F (37  C) (Oral)   Wt 192 lb (87.1 kg)   SpO2 98%   BMI 33.22 kg/m     In general, the patient is comfortable, no apparent distress.  Mood good.  Insight good.  Heart regular rate  and rhythm.  Lungs clear to ausculation bilaterally.  Rectal exam shows a couple of minimal external hemorrhoids without clot.  Limited finger exam shows some pain and muscle spasm with pressure.  She notes that her pain is around 7:00 on the rectum.    Diagnostics:     Results for orders placed or performed in visit on 06/23/20   Lipid [place] FASTING   Result Value Ref Range    Cholesterol 144 <=199 mg/dL    Triglycerides 171 (H) <=149 mg/dL    HDL Cholesterol 45 (L) >=50 mg/dL    LDL Calculated 65 <=129 mg/dL    Patient Fasting > 8hrs? Yes    Comprehensive Metabolic Panel   Result Value Ref Range    Sodium 139 136 - 145 mmol/L    Potassium 4.3 3.5 - 5.0 mmol/L    Chloride 104 98 - 107 mmol/L    CO2 26 22 - 31 mmol/L    Anion Gap, Calculation 9 5 - 18 mmol/L    Glucose 91 70 - 125 mg/dL    BUN 12 8 - 22 mg/dL    Creatinine 0.98 0.60 - 1.10 mg/dL    GFR MDRD Af Amer >60 >60 mL/min/1.73m2    GFR MDRD Non Af Amer 58 (L) >60 mL/min/1.73m2    Bilirubin, Total 0.6 0.0 - 1.0 mg/dL    Calcium 9.9 8.5 - 10.5 mg/dL    Protein, Total 7.1 6.0 - 8.0 g/dL    Albumin 4.1 3.5 - 5.0 g/dL    Alkaline Phosphatase 78 45 - 120 U/L    AST 24 0 - 40 U/L    ALT 24 0 - 45 U/L   Thyroid Stimulating Hormone (TSH)   Result Value Ref Range    TSH 2.69 0.30 - 5.00 uIU/mL   Glycosylated Hemoglobin A1c   Result Value Ref Range    Hemoglobin A1c 5.6 3.5 - 6.0 %   Vitamin D, Total (25-Hydroxy)   Result Value Ref Range    Vitamin D, Total (25-Hydroxy) 86.3 (H) 30.0 - 80.0 ng/mL   HM1 (CBC with Diff)   Result Value Ref Range    WBC 7.1 4.0 - 11.0 thou/uL    RBC 4.60 3.80 - 5.40 mill/uL    Hemoglobin 14.1 12.0 - 16.0 g/dL    Hematocrit 43.8 35.0 - 47.0 %    MCV 95 80 - 100 fL    MCH 30.7 27.0 - 34.0 pg    MCHC 32.2 32.0 - 36.0 g/dL    RDW 12.4 11.0 - 14.5 %    Platelets 312 140 - 440 thou/uL    MPV 10.1 8.5 - 12.5 fL    Neutrophils % 42 (L) 50 - 70 %    Lymphocytes % 45 (H) 20 - 40 %    Monocytes % 8 2 - 10 %    Eosinophils % 5 0 - 6 %    Basophils %  1 0 - 2 %    Neutrophils Absolute 3.0 2.0 - 7.7 thou/uL    Lymphocytes Absolute 3.2 0.8 - 4.4 thou/uL    Monocytes Absolute 0.6 0.0 - 0.9 thou/uL    Eosinophils Absolute 0.3 0.0 - 0.4 thou/uL    Basophils Absolute 0.1 0.0 - 0.2 thou/uL        Assessment:     1. Rectal bleeding    2. Rectal fissure    3. Hemorrhoids, unspecified hemorrhoid type    4. Hyperlipidemia, unspecified hyperlipidemia type    5. Menopause        Quality review:     PHQ-2 Total Score: 0 (9/16/2020  1:12 PM)      No data recorded  ______________________________________________________________________     BMI Readings from Last 1 Encounters:   10/02/20 33.22 kg/m        Plan:     1. Keep stools loose.  Use MiraLAX as needed.  2. Pain not severe enough to warrant topical nitroglycerin at this time.  3. Follow-up with Kristel Perez if not improving.  4. She might be interested in seeing a her 's colorectal specialist (Dr. Tovar) for this if not improving.  Referral is best managed by primary provider if not emergent.    Markie Staton MD  General Internal Medicine  [facility]    Return in about 1 year (around 10/2/2021), or if symptoms worsen or fail to improve, for follow up with your primary care provider.     No future appointments.      ______________________________________________________________________     Relevant ICD-10 codes and order associations:      ICD-10-CM    1. Rectal bleeding  K62.5    2. Rectal fissure  K60.2    3. Hemorrhoids, unspecified hemorrhoid type  K64.9    4. Hyperlipidemia, unspecified hyperlipidemia type  E78.5    5. Menopause  N95.1

## 2021-07-03 NOTE — ADDENDUM NOTE
Addendum Note by Saba Piper MD at 4/4/2018 10:17 AM     Author: aSba Piper MD Service: -- Author Type: Physician    Filed: 4/4/2018 10:17 AM Encounter Date: 4/1/2018 Status: Signed    : Saba Piper MD (Physician)    Addended by: SABA PIPER on: 4/4/2018 10:17 AM        Modules accepted: Orders

## 2021-07-04 NOTE — PROGRESS NOTES
Progress Notes by Kristel Perez CNP at 2021 12:40 PM     Author: Kristel Perez CNP Service: -- Author Type: Nurse Practitioner    Filed: 2021 11:27 AM Encounter Date: 2021 Status: Signed    : Kristel Perez CNP (Nurse Practitioner)           What phone number would you like to be contacted at? [phone]  How would you like to obtain your AVS? AVS Preference: MyChart.          Telephone Visit    Type of service:  Telephone Visit  8 minutes       Subjective: Maria Elena is being seen today via telephone visit.        [facility]  Patient Name: Maria Elena Abreu  Patient Age: 61 y.o.  YOB: 1960  MRN: [id number]    Date of Telephone Visit: 2021  Reason for Telephone Visit:   Chief Complaint   Patient presents with   ? Weight Loss     would like to discuss options, medication            Assessment / Plan / Medical Decision Makin. Body mass index (BMI) of 32.0 to 32.9 in adult  Discussed with patient is has been more than 1 year since she has been seen in clinic with the recommendation to schedule an annual physical and lab work. If starting a medication, would recommend monthly weight and bp checks.  Recommend increasing physical activity to 30 minutes most days of the week outside of regular schedule.        No orders of the defined types were placed in this encounter.  Followup: Return in about 2 weeks (around 2021). earlier if needed.          Health Maintenance Review  Health Maintenance   Topic Date Due   ? HEPATITIS C SCREENING  Never done   ? HIV SCREENING  Never done   ? ZOSTER VACCINES (2 of 3) 2014   ? PREVENTIVE CARE VISIT  2020   ? TD 18+ HE  10/20/2020   ? ADVANCE CARE PLANNING  10/21/2020   ? MAMMOGRAM  07/15/2022   ? PAP SMEAR  2023   ? COLORECTAL CANCER SCREENING  10/03/2024   ? LIPID  2025   ? INFLUENZA VACCINE RULE BASED  Completed   ? TDAP ADULT ONE TIME DOSE  Completed   ? COVID-19 Vaccine   Completed   ? Pneumococcal Vaccine: Pediatrics (0 to 5 Years) and At-Risk Patients (6 to 64 Years)  Aged Out   ? HEPATITIS B VACCINES  Aged Out         I am having Maria Elena Abreu maintain her aspirin, MULTIVITS-MIN/IRON/FA/LUTEIN (CENTRUM SILVER WOMEN ORAL), progesterone, cholecalciferol (vitamin D3), coenzyme Q10, calcium-vitamin D, omega-3 fatty acids-vitamin E, TESTOSTERONE IM, RESVERATROL ORAL, rosuvastatin, and TURMERIC ORAL.      HPI:  Maria Elena Abreu is a 61 y.o. year old who presents to video visit today with chronic conditions including Vitamin D deficiency, history of hyperlipidemia.  Patient is followed by [facility] CNP for bioidentical hormone replacement therapies and sexual health who also prescribes and manages patient's progesterone and testosterone.      Patient reports she is interested in medication. She reports increase weight in the mid section.  She is walking with her work.  She states she is active and is attempting to eat clean.  She would like to lose 50 pounds.     Review of Systems-unremarkable other than listed above and below       Current Scheduled Meds:  Outpatient Encounter Medications as of 6/23/2021   Medication Sig Dispense Refill   ? aspirin 81 MG EC tablet Take 81 mg by mouth daily. 2 tablets once daily     ? calcium-vitamin D 250-100 mg-unit per tablet Take 2 tablets by mouth 2 (two) times a day.     ? cholecalciferol, vitamin D3, 5,000 unit Tab Take 10,000 Units by mouth daily. 2 x daily     ? coenzyme Q10 100 mg capsule Take 300 mg by mouth daily.     ? MULTIVITS-MIN/IRON/FA/LUTEIN (CENTRUM SILVER WOMEN ORAL) Take by mouth.     ? omega-3 fatty acids-vitamin E (FISH OIL) 1,000 mg cap Take 1,200 mg by mouth 2 (two) times a day.     ? progesterone (PROMETRIUM) 100 MG capsule Take 100 mg by mouth 3 (three) times a day. 1 in the AM and 2 in the PM.     ? RESVERATROL ORAL Take 2 tablets by mouth 2 (two) times a day.     ? rosuvastatin (CRESTOR) 40 MG tablet Take 1 tablet  (40 mg total) by mouth at bedtime. 90 tablet 3   ? TESTOSTERONE IM Inject 0.2 mL into the shoulder, thigh, or buttocks. About every 2 weeks.     ? TURMERIC ORAL Take by mouth daily.       No facility-administered encounter medications on file as of 6/23/2021.      No past medical history on file.  Past Surgical History:   Procedure Laterality Date   ? AUGMENTATION MAMMAPLASTY Bilateral 1989     Social History     Tobacco Use   ? Smoking status: Never Smoker   ? Smokeless tobacco: Never Used   Substance Use Topics   ? Alcohol use: No   ? Drug use: No     Family History   Problem Relation Age of Onset   ? Diabetes Mother    ? Heart disease Mother    ? Hypertension Mother    ? Hyperlipidemia Mother    ? Osteoporosis Mother    ? Hyperlipidemia Father    ? Stroke Maternal Grandmother    ? Alzheimer's disease Sister      Social History     Social History Narrative   ? Not on file       Objective / Physical Examination:  There were no vitals filed for this visit.  Wt Readings from Last 3 Encounters:   10/02/20 192 lb (87.1 kg)   06/22/20 188 lb (85.3 kg)   11/04/19 185 lb 1.6 oz (84 kg)     There is no height or weight on file to calculate BMI.     RESP: No audible wheeze, cough  PSYCH:  judgement and insight intact, normal speech         No results found.  No results found for this or any previous visit (from the past 240 hour(s)).  Diagnostics:     Results for orders placed or performed in visit on 03/10/21   Ferritin   Result Value Ref Range    Ferritin 100 10 - 130 ng/mL   Folate, Serum   Result Value Ref Range    Folate >20.0 >=3.5 ng/mL   T4, Free   Result Value Ref Range    Free T4 0.8 0.7 - 1.8 ng/dL   Thyroid Stimulating Hormone (TSH)   Result Value Ref Range    TSH 1.59 0.30 - 5.00 uIU/mL   Antinuclear Antibodies Screen (TRI)   Result Value Ref Range    TRI Screen [place] 0.3 <=2.9 U   Zinc, Serum   Result Value Ref Range    Zinc, Serum/Plasma 87.1 60.0 - 120.0 ug/dL   Dehydroepiandrosterone Sulfate, Serum  (DHEAS)   Result Value Ref Range    DHEAS 53 13 - 130 ug/dL   Testosterone, Free and Total   Result Value Ref Range    Testosterone, Total 21 8 - 60 ng/dL    Sex Hormone Bind Globulin 21 (L) 30 - 135 nmol/L    Free Testosterone Calc 0.47 (H) 0.06 - 0.38 ng/dL   Androstenedione   Result Value Ref Range    Androstenedione by TMS 0.203 0.130 - 0.820 ng/mL   Vitamin D, Total (25-Hydroxy)   Result Value Ref Range    Vitamin D, Total (25-Hydroxy) 81.6 (H) 30.0 - 80.0 ng/mL   1,25 DihydroxyVitaminD, Adult ([id number])   Result Value Ref Range    1,25 Dihydroxyvitamin D 67.0 19.9 - 79.3 pg/mL       Quality review:     PHQ-2 Total Score: 0 (9/16/2020  1:12 PM)      No data recorded      Kristel Perez, ROWDY  Internal Medicine  [address] [address]

## 2021-07-13 ENCOUNTER — RECORDS - HEALTHEAST (OUTPATIENT)
Dept: ADMINISTRATIVE | Facility: CLINIC | Age: 61
End: 2021-07-13

## 2021-07-16 ENCOUNTER — TELEPHONE (OUTPATIENT)
Dept: FAMILY MEDICINE | Facility: CLINIC | Age: 61
End: 2021-07-16

## 2021-07-16 NOTE — TELEPHONE ENCOUNTER
Left message asking pt to change appointment time on 8/6 from 8:15 to 8:30.  Asked pt to call back and reschedule if 8:30 does not work.

## 2021-07-21 ENCOUNTER — RECORDS - HEALTHEAST (OUTPATIENT)
Dept: ADMINISTRATIVE | Facility: CLINIC | Age: 61
End: 2021-07-21

## 2021-07-22 ENCOUNTER — RECORDS - HEALTHEAST (OUTPATIENT)
Dept: INTERNAL MEDICINE | Facility: CLINIC | Age: 61
End: 2021-07-22

## 2021-07-22 DIAGNOSIS — Z12.31 OTHER SCREENING MAMMOGRAM: ICD-10-CM

## 2021-07-28 ENCOUNTER — ANCILLARY PROCEDURE (OUTPATIENT)
Dept: MAMMOGRAPHY | Facility: CLINIC | Age: 61
End: 2021-07-28
Attending: NURSE PRACTITIONER
Payer: COMMERCIAL

## 2021-07-28 DIAGNOSIS — Z12.31 SCREENING MAMMOGRAM, ENCOUNTER FOR: ICD-10-CM

## 2021-07-28 PROCEDURE — 77063 BREAST TOMOSYNTHESIS BI: CPT

## 2021-08-03 DIAGNOSIS — E78.5 HYPERLIPIDEMIA, UNSPECIFIED HYPERLIPIDEMIA TYPE: Primary | ICD-10-CM

## 2021-08-06 ENCOUNTER — OFFICE VISIT (OUTPATIENT)
Dept: INTERNAL MEDICINE | Facility: CLINIC | Age: 61
End: 2021-08-06
Payer: COMMERCIAL

## 2021-08-06 VITALS
OXYGEN SATURATION: 96 % | BODY MASS INDEX: 33.43 KG/M2 | HEART RATE: 73 BPM | HEIGHT: 64 IN | TEMPERATURE: 97.1 F | DIASTOLIC BLOOD PRESSURE: 76 MMHG | WEIGHT: 195.8 LBS | SYSTOLIC BLOOD PRESSURE: 112 MMHG

## 2021-08-06 DIAGNOSIS — R73.01 ELEVATED FASTING GLUCOSE: ICD-10-CM

## 2021-08-06 DIAGNOSIS — E55.9 VITAMIN D DEFICIENCY: ICD-10-CM

## 2021-08-06 DIAGNOSIS — E78.5 HYPERLIPIDEMIA, UNSPECIFIED HYPERLIPIDEMIA TYPE: ICD-10-CM

## 2021-08-06 DIAGNOSIS — N18.30 STAGE 3 CHRONIC KIDNEY DISEASE, UNSPECIFIED WHETHER STAGE 3A OR 3B CKD (H): ICD-10-CM

## 2021-08-06 DIAGNOSIS — E66.811 CLASS 1 OBESITY WITH SERIOUS COMORBIDITY AND BODY MASS INDEX (BMI) OF 33.0 TO 33.9 IN ADULT, UNSPECIFIED OBESITY TYPE: ICD-10-CM

## 2021-08-06 DIAGNOSIS — Z11.4 SCREENING FOR HIV (HUMAN IMMUNODEFICIENCY VIRUS): ICD-10-CM

## 2021-08-06 DIAGNOSIS — Z11.59 NEED FOR HEPATITIS C SCREENING TEST: ICD-10-CM

## 2021-08-06 DIAGNOSIS — Z00.00 ANNUAL PHYSICAL EXAM: Primary | ICD-10-CM

## 2021-08-06 LAB
ALBUMIN SERPL-MCNC: 4.1 G/DL (ref 3.5–5)
ALP SERPL-CCNC: 66 U/L (ref 45–120)
ALT SERPL W P-5'-P-CCNC: 32 U/L (ref 0–45)
ANION GAP SERPL CALCULATED.3IONS-SCNC: 12 MMOL/L (ref 5–18)
AST SERPL W P-5'-P-CCNC: ABNORMAL U/L
BASOPHILS # BLD AUTO: 0.1 10E3/UL (ref 0–0.2)
BASOPHILS NFR BLD AUTO: 1 %
BILIRUB SERPL-MCNC: 0.6 MG/DL (ref 0–1)
BUN SERPL-MCNC: 15 MG/DL (ref 8–22)
CALCIUM SERPL-MCNC: 10 MG/DL (ref 8.5–10.5)
CHLORIDE BLD-SCNC: 103 MMOL/L (ref 98–107)
CHOLEST SERPL-MCNC: 136 MG/DL
CO2 SERPL-SCNC: 24 MMOL/L (ref 22–31)
CREAT SERPL-MCNC: 1.03 MG/DL (ref 0.6–1.1)
EOSINOPHIL # BLD AUTO: 0.3 10E3/UL (ref 0–0.7)
EOSINOPHIL NFR BLD AUTO: 4 %
ERYTHROCYTE [DISTWIDTH] IN BLOOD BY AUTOMATED COUNT: 12.1 % (ref 10–15)
FASTING STATUS PATIENT QL REPORTED: YES
GFR SERPL CREATININE-BSD FRML MDRD: 58 ML/MIN/1.73M2
GLUCOSE BLD-MCNC: 95 MG/DL (ref 70–125)
HBA1C MFR BLD: 5.6 % (ref 0–5.6)
HCT VFR BLD AUTO: 44.8 % (ref 35–47)
HDLC SERPL-MCNC: 44 MG/DL
HGB BLD-MCNC: 15.2 G/DL (ref 11.7–15.7)
HIV 1+2 AB+HIV1 P24 AG SERPL QL IA: NEGATIVE
IMM GRANULOCYTES # BLD: 0 10E3/UL
IMM GRANULOCYTES NFR BLD: 0 %
LDLC SERPL CALC-MCNC: 62 MG/DL
LYMPHOCYTES # BLD AUTO: 3.3 10E3/UL (ref 0.8–5.3)
LYMPHOCYTES NFR BLD AUTO: 43 %
MCH RBC QN AUTO: 32.3 PG (ref 26.5–33)
MCHC RBC AUTO-ENTMCNC: 33.9 G/DL (ref 31.5–36.5)
MCV RBC AUTO: 95 FL (ref 78–100)
MONOCYTES # BLD AUTO: 0.6 10E3/UL (ref 0–1.3)
MONOCYTES NFR BLD AUTO: 8 %
NEUTROPHILS # BLD AUTO: 3.4 10E3/UL (ref 1.6–8.3)
NEUTROPHILS NFR BLD AUTO: 44 %
PLATELET # BLD AUTO: 337 10E3/UL (ref 150–450)
POTASSIUM BLD-SCNC: ABNORMAL MMOL/L
PROT SERPL-MCNC: ABNORMAL G/DL
RBC # BLD AUTO: 4.71 10E6/UL (ref 3.8–5.2)
SODIUM SERPL-SCNC: 139 MMOL/L (ref 136–145)
TRIGL SERPL-MCNC: 152 MG/DL
WBC # BLD AUTO: 7.6 10E3/UL (ref 4–11)

## 2021-08-06 PROCEDURE — 90472 IMMUNIZATION ADMIN EACH ADD: CPT | Performed by: NURSE PRACTITIONER

## 2021-08-06 PROCEDURE — 82565 ASSAY OF CREATININE: CPT | Performed by: NURSE PRACTITIONER

## 2021-08-06 PROCEDURE — 82374 ASSAY BLOOD CARBON DIOXIDE: CPT | Performed by: NURSE PRACTITIONER

## 2021-08-06 PROCEDURE — 84075 ASSAY ALKALINE PHOSPHATASE: CPT | Performed by: NURSE PRACTITIONER

## 2021-08-06 PROCEDURE — 82040 ASSAY OF SERUM ALBUMIN: CPT | Performed by: NURSE PRACTITIONER

## 2021-08-06 PROCEDURE — 83036 HEMOGLOBIN GLYCOSYLATED A1C: CPT | Performed by: NURSE PRACTITIONER

## 2021-08-06 PROCEDURE — 85025 COMPLETE CBC W/AUTO DIFF WBC: CPT | Performed by: NURSE PRACTITIONER

## 2021-08-06 PROCEDURE — 86803 HEPATITIS C AB TEST: CPT | Performed by: NURSE PRACTITIONER

## 2021-08-06 PROCEDURE — 87389 HIV-1 AG W/HIV-1&-2 AB AG IA: CPT | Performed by: NURSE PRACTITIONER

## 2021-08-06 PROCEDURE — 90714 TD VACC NO PRESV 7 YRS+ IM: CPT | Performed by: NURSE PRACTITIONER

## 2021-08-06 PROCEDURE — 82247 BILIRUBIN TOTAL: CPT | Performed by: NURSE PRACTITIONER

## 2021-08-06 PROCEDURE — 80061 LIPID PANEL: CPT | Performed by: NURSE PRACTITIONER

## 2021-08-06 PROCEDURE — 99396 PREV VISIT EST AGE 40-64: CPT | Mod: 25 | Performed by: NURSE PRACTITIONER

## 2021-08-06 PROCEDURE — 99213 OFFICE O/P EST LOW 20 MIN: CPT | Mod: 25 | Performed by: NURSE PRACTITIONER

## 2021-08-06 PROCEDURE — 84520 ASSAY OF UREA NITROGEN: CPT | Performed by: NURSE PRACTITIONER

## 2021-08-06 PROCEDURE — 36415 COLL VENOUS BLD VENIPUNCTURE: CPT | Performed by: NURSE PRACTITIONER

## 2021-08-06 PROCEDURE — 82435 ASSAY OF BLOOD CHLORIDE: CPT | Performed by: NURSE PRACTITIONER

## 2021-08-06 PROCEDURE — 84460 ALANINE AMINO (ALT) (SGPT): CPT | Performed by: NURSE PRACTITIONER

## 2021-08-06 PROCEDURE — 90471 IMMUNIZATION ADMIN: CPT | Performed by: NURSE PRACTITIONER

## 2021-08-06 PROCEDURE — 90750 HZV VACC RECOMBINANT IM: CPT | Performed by: NURSE PRACTITIONER

## 2021-08-06 PROCEDURE — 82310 ASSAY OF CALCIUM: CPT | Performed by: NURSE PRACTITIONER

## 2021-08-06 PROCEDURE — 84295 ASSAY OF SERUM SODIUM: CPT | Performed by: NURSE PRACTITIONER

## 2021-08-06 PROCEDURE — 82947 ASSAY GLUCOSE BLOOD QUANT: CPT | Performed by: NURSE PRACTITIONER

## 2021-08-06 RX ORDER — CLOBETASOL PROPIONATE 0.5 MG/ML
1 SOLUTION TOPICAL PRN
COMMUNITY
Start: 2021-03-10 | End: 2023-06-12

## 2021-08-06 RX ORDER — OMEGA-3-ACID ETHYL ESTERS 900 MG/1
1200 CAPSULE, LIQUID FILLED ORAL DAILY
COMMUNITY

## 2021-08-06 RX ORDER — ROSUVASTATIN CALCIUM 40 MG/1
40 TABLET, COATED ORAL AT BEDTIME
Qty: 90 TABLET | Refills: 3 | Status: SHIPPED | OUTPATIENT
Start: 2021-08-06 | End: 2022-08-31

## 2021-08-06 RX ORDER — ROSUVASTATIN CALCIUM 40 MG/1
40 TABLET, COATED ORAL AT BEDTIME
Qty: 90 TABLET | Refills: 3 | Status: CANCELLED | OUTPATIENT
Start: 2021-08-06

## 2021-08-06 ASSESSMENT — MIFFLIN-ST. JEOR: SCORE: 1429.17

## 2021-08-06 NOTE — PROGRESS NOTES
SUBJECTIVE:   CC: Maria Elena Abreu is an 61 year old woman who presents for preventive health visit.       Patient has been advised of split billing requirements and indicates understanding: Yes  Healthy Habits:     Getting at least 3 servings of Calcium per day:  Yes    Bi-annual eye exam:  Yes    Dental care twice a year:  Yes    Sleep apnea or symptoms of sleep apnea:  None    Diet:  Regular (no restrictions)    Frequency of exercise:  1 day/week    Duration of exercise:  Less than 15 minutes    Taking medications regularly:  Yes    Medication side effects:  None    PHQ-2 Total Score: 0    Additional concerns today:  No          Weight gain and patient is interested in starting a medication.    Cholesterol and refill meds    Hyperlipidemia Follow-Up      Are you regularly taking any medication or supplement to lower your cholesterol?   Yes- rosuvastatin    Are you having muscle aches or other side effects that you think could be caused by your cholesterol lowering medication?  No      Today's PHQ-2 Score:   PHQ-2 ( 1999 Pf[employer]) 8/6/2021   Q1: Little interest or pleasure in doing things 0   Q2: Feeling down, depressed or hopeless 0   PHQ-2 Score 0   Q1: Little interest or pleasure in doing things Not at all   Q2: Feeling down, depressed or hopeless Not at all   PHQ-2 Score 0       Abuse: Current or Past (Physical, Sexual or Emotional) - No  Do you feel safe in your environment? Yes    Have you ever done Advance Care Planning? (For example, a Health Directive, POLST, or a discussion with a medical provider or your loved ones about your wishes): Yes, patient states has an Advance Care Planning document and will bring a copy to the clinic.    Social History     Tobacco Use     Smoking status: Never Smoker     Smokeless tobacco: Never Used   Substance Use Topics     Alcohol use: No         Alcohol Use 8/6/2021   Prescreen: >3 drinks/day or >7 drinks/week? No       Reviewed orders with patient.  Reviewed health  maintenance and updated orders accordingly - Yes  BP Readings from Last 3 Encounters:   08/06/21 112/76   10/02/20 124/74   06/22/20 134/74    Wt Readings from Last 3 Encounters:   08/06/21 88.8 kg (195 lb 12.8 oz)   10/02/20 87.1 kg (192 lb)   06/22/20 85.3 kg (188 lb)                  Patient Active Problem List   Diagnosis     HLD (hyperlipidemia)     Menopause     Family history of Alzheimer's disease     Chronic kidney disease, stage 3     Past Surgical History:   Procedure Laterality Date     MAMMOPLASTY AUGMENTATION Bilateral 1989       Social History     Tobacco Use     Smoking status: Never Smoker     Smokeless tobacco: Never Used   Substance Use Topics     Alcohol use: No     Family History   Problem Relation Age of Onset     Diabetes Mother      Heart Disease Mother      Hypertension Mother      Hyperlipidemia Mother      Osteoporosis Mother      Hyperlipidemia Father      Cerebrovascular Disease Maternal Grandmother      Alzheimer Disease Sister          Current Outpatient Medications   Medication Sig Dispense Refill     aspirin 81 MG EC tablet [ASPIRIN 81 MG EC TABLET] Take 81 mg by mouth daily. 2 tablets once daily       calcium-vitamin D 250-100 mg-unit per tablet [CALCIUM-VITAMIN D 250-100 MG-UNIT PER TABLET] Take 2 tablets by mouth 2 (two) times a day.       cholecalciferol, vitamin D3, 5,000 unit Tab [CHOLECALCIFEROL, VITAMIN D3, 5,000 UNIT TAB] Take 10,000 Units by mouth daily. 2 x daily       coenzyme Q10 100 mg capsule [COENZYME Q10 100 MG CAPSULE] Take 300 mg by mouth daily.       liraglutide - Weight Management (SAXENDA) 18 MG/3ML pen 0.6mg once daily for 1 week, increase by 0.6mg daily at weekly intervals to target dose of 3mg once daily. 3 mL 4     MULTIVITS-MIN/IRON/FA/LUTEIN (CENTRUM SILVER WOMEN ORAL) [MULTIVITS-MIN/IRON/FA/LUTEIN (CENTRUM SILVER WOMEN ORAL)] Take by mouth.       progesterone (PROMETRIUM) 100 MG capsule [PROGESTERONE (PROMETRIUM) 100 MG CAPSULE] Take 100 mg by mouth 3  (three) times a day. 1 in the AM and 2 in the PM.       RESVERATROL ORAL [RESVERATROL ORAL] Take 2 tablets by mouth 2 (two) times a day.       rosuvastatin (CRESTOR) 40 MG tablet Take 1 tablet (40 mg) by mouth At Bedtime 90 tablet 3     TESTOSTERONE IM [TESTOSTERONE IM] Inject 0.2 mL into the shoulder, thigh, or buttocks. About every 2 weeks.       TURMERIC ORAL [TURMERIC ORAL] Take by mouth daily.       clobetasol (TEMOVATE) 0.05 % external solution Apply 1 mL topically as needed       Omega-3-acid Ethyl Esters (FISH OIL OMEGA-3 FATTY ACID) 320MG/ML oral suspension Take 1,200 mg by mouth daily       Allergies   Allergen Reactions     Ragweed Pollen [Ragweeds] Itching       Breast Cancer Screening:  Any new diagnosis of family breast, ovarian, or bowel cancer? No    FHS-7:   Breast CA Risk Assessment (FHS-7) 2021   Did any of your first-degree relatives have breast or ovarian cancer? No   Did any of your relatives have bilateral breast cancer? No   Did any man in your family have breast cancer? No   Did any woman in your family have breast and ovarian cancer? No   Did any woman in your family have breast cancer before age 50 y? No   Do you have 2 or more relatives with breast and/or ovarian cancer? No   Do you have 2 or more relatives with breast and/or bowel cancer? No       Mammogram Screening: Recommended mammography every 1-2 years with patient discussion and risk factor consideration  Pertinent mammograms are reviewed under the imaging tab.    History of abnormal Pap smear: NO - age 30- 65 PAP every 3 years recommended     Reviewed and updated as needed this visit by clinical staff  Tobacco  Allergies  Meds              Reviewed and updated as needed this visit by Provider                No past medical history on file.   Past Surgical History:   Procedure Laterality Date     MAMMOPLASTY AUGMENTATION Bilateral      OB History    Para Term  AB Living   3 3 3 0 0 0   SAB TAB Ectopic  "Multiple Live Births   0 0 0 0 0      # Outcome Date GA Lbr Sukhjinder/2nd Weight Sex Delivery Anes PTL Lv   3 Term            2 Term            1 Term                Review of Systems  CONSTITUTIONAL: NEGATIVE for fever, chills, change in weight  INTEGUMENTARY/SKIN: NEGATIVE for worrisome rashes, moles or lesions  EYES: NEGATIVE for vision changes or irritation  ENT: NEGATIVE for ear, mouth and throat problems  RESP: NEGATIVE for significant cough or SOB  BREAST: NEGATIVE for masses, tenderness or discharge  CV: NEGATIVE for chest pain, palpitations or peripheral edema  GI: NEGATIVE for nausea, abdominal pain, heartburn, or change in bowel habits  : NEGATIVE for unusual urinary or vaginal symptoms. No vaginal bleeding.  MUSCULOSKELETAL: NEGATIVE for significant arthralgias or myalgia  NEURO: NEGATIVE for weakness, dizziness or paresthesias  PSYCHIATRIC: NEGATIVE for changes in mood or affect      OBJECTIVE:   /76 (BP Location: Right arm, Patient Position: Sitting, Cuff Size: Adult Large)   Pulse 73   Temp 97.1  F (36.2  C) (Temporal)   Ht 1.619 m (5' 3.75\")   Wt 88.8 kg (195 lb 12.8 oz)   LMP 07/26/2005 (Exact Date)   SpO2 96%   Breastfeeding No   BMI 33.87 kg/m    Physical Exam  GENERAL APPEARANCE: healthy, alert and no distress  EYES: Eyes grossly normal to inspection, PERRL and conjunctivae and sclerae normal  HENT: ear canals and TM's normal, nose and mouth without ulcers or lesions, oropharynx clear and oral mucous membranes moist  NECK: no adenopathy, no asymmetry, masses, or scars and thyroid normal to palpation  RESP: lungs clear to auscultation - no rales, rhonchi or wheezes  BREAST: normal without masses, tenderness or nipple discharge and no palpable axillary masses or adenopathy  CV: regular rate and rhythm, normal S1 S2, no S3 or S4, no murmur, click or rub, no peripheral edema and peripheral pulses strong  ABDOMEN: soft, nontender, no hepatosplenomegaly, no masses and bowel sounds normal  MS: " "no musculoskeletal defects are noted and gait is age appropriate without ataxia  SKIN: no suspicious lesions or rashes  NEURO: Normal strength and tone, sensory exam grossly normal, mentation intact and speech normal  PSYCH: mentation appears normal and affect normal/bright        ASSESSMENT/PLAN:   Maria Elena was seen today for physical.    Diagnoses and all orders for this visit:    Annual physical exam  -     Lipid Profile (Chol, Trig, HDL, LDL calc); Future  -     Comprehensive metabolic panel; Future  -     CBC with platelets and differential; Future  -     UA Macro with Reflex to Micro and Culture - lab collect; Future    Hyperlipidemia, unspecified hyperlipidemia type  -     rosuvastatin (CRESTOR) 40 MG tablet; Take 1 tablet (40 mg) by mouth At Bedtime    Screening for HIV (human immunodeficiency virus)  -     HIV Antigen Antibody Combo; Future    Need for hepatitis C screening test  -     Hepatitis C Screen Reflex to HCV RNA Quant and Genotype; Future    Class 1 obesity with serious comorbidity and body mass index (BMI) of 33.0 to 33.9 in adult, unspecified obesity type  -     liraglutide - Weight Management (SAXENDA) 18 MG/3ML pen; 0.6mg once daily for 1 week, increase by 0.6mg daily at weekly intervals to target dose of 3mg once daily.    Stage 3 chronic kidney disease, unspecified whether stage 3a or 3b CKD    Vitamin D deficiency    Elevated fasting glucose  -     Hemoglobin A1c; Future    Other orders  -     REVIEW OF HEALTH MAINTENANCE PROTOCOL ORDERS  -     TD PRESERV FREE, IM (7+ YRS)  -     ZOSTER VACCINE RECOMBINANT ADJUVANTED IM NJX        Patient has been advised of split billing requirements and indicates understanding: Yes  COUNSELING:  Reviewed preventive health counseling, as reflected in patient instructions       Regular exercise       Healthy diet/nutrition    Estimated body mass index is 33.87 kg/m  as calculated from the following:    Height as of this encounter: 1.619 m (5' 3.75\").    Weight " as of this encounter: 88.8 kg (195 lb 12.8 oz).    Weight management plan: Discussed healthy diet and exercise guidelines patient will be started on Saxenda titrate dose upward this is reviewed with patient recommend increasing physical activity 30 minutes most days of the week outside of her regular schedule.  She will return in 1 month for a weight check and med check sooner if needed.  Wt Readings from Last 3 Encounters:   08/06/21 88.8 kg (195 lb 12.8 oz)   10/02/20 87.1 kg (192 lb)   06/22/20 85.3 kg (188 lb)       She reports that she has never smoked. She has never used smokeless tobacco.      Counseling Resources:  ATP IV Guidelines  Pooled Cohorts Equation Calculator  Breast Cancer Risk Calculator  BRCA-Related Cancer Risk Assessment: FHS-7 Tool  FRAX Risk Assessment  ICSI Preventive Guidelines  Dietary Guidelines for Americans, 2010  USDA's MyPlate  ASA Prophylaxis  Lung CA Screening    Kristel Perez NP  [facility]

## 2021-08-08 LAB — HCV AB SERPL QL IA: NONREACTIVE

## 2021-08-25 ENCOUNTER — MYC MEDICAL ADVICE (OUTPATIENT)
Dept: INTERNAL MEDICINE | Facility: CLINIC | Age: 61
End: 2021-08-25

## 2021-09-17 NOTE — PROGRESS NOTES
Maria Elena is a 61 year old who is being evaluated via a billable telephone visit.      What phone number would you like to be contacted at? [phone]  How would you like to obtain your AVS? [facility]    Assessment & Plan   Problem List Items Addressed This Visit        Urinary    Chronic kidney disease, stage 3 - Primary      Other Visit Diagnoses     Vitamin D deficiency        Screening for osteoporosis        Relevant Orders    DX Hip/Pelvis/Spine    Body mass index (BMI) of 32.0 to 32.9 in adult             Recent lab work was reviewed with patient all questions were addressed recommend continuing 30 minutes purposeful activity most days of the week weight loss is encouraged we will order a bone density scan and they will contact patient to schedule.  All patient's questions addressed verbalized understanding provided.    I spent a total of 13 minutes on the day of the visit.   Time spent doing chart review, history and exam, documentation and further activities per the note           No follow-ups on file.    Kristel Perez NP  [facility]    aNdya Arredondo is a 61 year old who presents for follow up of recent lab work.      HPI       Review of Systems   Unremarkable as listed above and below      Objective       Vitals:  No vitals were obtained today due to virtual visit.    Physical Exam     PSYCH: Alert and oriented times 3; coherent speech, normal   rate and volume, able to articulate logical thoughts, able   to abstract reason, no tangential thoughts, no hallucinations   or delusions  Her affect is normal  RESP: No cough, no audible wheezing, able to talk in full sentences  Remainder of exam unable to be completed due to telephone visits    Office Visit on 08/06/2021   Component Date Value Ref Range Status     HIV Antigen Antibody Combo 08/06/2021 Negative  Negative Final     Hepatitis C Antibody 08/06/2021 Nonreactive  Nonreactive Final     Cholesterol 08/06/2021 136  <=199 mg/dL  Final     Triglycerides 08/06/2021 152* <=149 mg/dL Final     Direct Measure HDL 08/06/2021 44* >=50 mg/dL Final    HDL Cholesterol Reference Range:     0-2 years:   No reference ranges established for patients under 2 years old  at [facility] for lipid analytes.    2-8 years:  Greater than 45 mg/dL     18 years and older:   Female: Greater than or equal to 50 mg/dL   Male:   Greater than or equal to 40 mg/dL     LDL Cholesterol Calculated 08/06/2021 62  <=129 mg/dL Final     Patient Fasting > 8hrs? 08/06/2021 Yes   Final     Sodium 08/06/2021 139  136 - 145 mmol/L Final     Potassium 08/06/2021    Final    Specimen hemolyzed, result invalid     Chloride 08/06/2021 103  98 - 107 mmol/L Final     Carbon Dioxide (CO2) 08/06/2021 24  22 - 31 mmol/L Final     Anion Gap 08/06/2021 12  5 - 18 mmol/L Final     Urea Nitrogen 08/06/2021 15  8 - 22 mg/dL Final     Creatinine 08/06/2021 1.03  0.60 - 1.10 mg/dL Final     Calcium 08/06/2021 10.0  8.5 - 10.5 mg/dL Final     Glucose 08/06/2021 95  70 - 125 mg/dL Final     Alkaline Phosphatase 08/06/2021 66  45 - 120 U/L Final     AST 08/06/2021    Final    Specimen hemolyzed, result invalid     ALT 08/06/2021 32  0 - 45 U/L Final     Protein Total 08/06/2021    Final    Specimen hemolyzed, result invalid     Albumin 08/06/2021 4.1  3.5 - 5.0 g/dL Final     Bilirubin Total 08/06/2021 0.6  0.0 - 1.0 mg/dL Final     GFR Estimate 08/06/2021 58* >60 mL/min/1.73m2 Final    As of July 11, 2021, eGFR is calculated by the CKD-EPI creatinine equation, without race adjustment. eGFR can be influenced by muscle mass, exercise, and diet. The reported eGFR is an estimation only and is only applicable if the renal function is stable.     Hemoglobin A1C 08/06/2021 5.6  0.0 - 5.6 % Final    Normal <5.7%   Prediabetes 5.7-6.4%    Diabetes 6.5% or higher     Note: Adopted from ADA consensus guidelines.     WBC Count 08/06/2021 7.6  4.0 - 11.0 10e3/uL Final     RBC Count 08/06/2021 4.71   3.80 - 5.20 10e6/uL Final     Hemoglobin 08/06/2021 15.2  11.7 - 15.7 g/dL Final     Hematocrit 08/06/2021 44.8  35.0 - 47.0 % Final     MCV 08/06/2021 95  78 - 100 fL Final     MCH 08/06/2021 32.3  26.5 - 33.0 pg Final     MCHC 08/06/2021 33.9  31.5 - 36.5 g/dL Final     RDW 08/06/2021 12.1  10.0 - 15.0 % Final     Platelet Count 08/06/2021 337  150 - 450 10e3/uL Final     % Neutrophils 08/06/2021 44  % Final     % Lymphocytes 08/06/2021 43  % Final     % Monocytes 08/06/2021 8  % Final     % Eosinophils 08/06/2021 4  % Final     % Basophils 08/06/2021 1  % Final     % Immature Granulocytes 08/06/2021 0  % Final     Absolute Neutrophils 08/06/2021 3.4  1.6 - 8.3 10e3/uL Final     Absolute Lymphocytes 08/06/2021 3.3  0.8 - 5.3 10e3/uL Final     Absolute Monocytes 08/06/2021 0.6  0.0 - 1.3 10e3/uL Final     Absolute Eosinophils 08/06/2021 0.3  0.0 - 0.7 10e3/uL Final     Absolute Basophils 08/06/2021 0.1  0.0 - 0.2 10e3/uL Final     Absolute Immature Granulocytes 08/06/2021 0.0  <=0.0 10e3/uL Final           The 10-year ASCVD risk score (Salvatorevladimir WRIGHT Jr., et al., 2013) is: 2.7%    Values used to calculate the score:      Age: 61 years      Sex: Female      Is Non- : No      Diabetic: No      Tobacco smoker: No      Systolic Blood Pressure: 112 mmHg      Is BP treated: No      HDL Cholesterol: 44 mg/dL      Total Cholesterol: 136 mg/dL      Phone call duration: 12 minutes

## 2021-09-22 ENCOUNTER — VIRTUAL VISIT (OUTPATIENT)
Dept: INTERNAL MEDICINE | Facility: CLINIC | Age: 61
End: 2021-09-22
Payer: COMMERCIAL

## 2021-09-22 DIAGNOSIS — Z13.820 SCREENING FOR OSTEOPOROSIS: ICD-10-CM

## 2021-09-22 DIAGNOSIS — E55.9 VITAMIN D DEFICIENCY: ICD-10-CM

## 2021-09-22 DIAGNOSIS — N18.30 STAGE 3 CHRONIC KIDNEY DISEASE, UNSPECIFIED WHETHER STAGE 3A OR 3B CKD (H): Primary | ICD-10-CM

## 2021-09-22 PROCEDURE — 99213 OFFICE O/P EST LOW 20 MIN: CPT | Mod: TEL | Performed by: NURSE PRACTITIONER

## 2022-01-19 ENCOUNTER — ALLIED HEALTH/NURSE VISIT (OUTPATIENT)
Dept: FAMILY MEDICINE | Facility: CLINIC | Age: 62
End: 2022-01-19
Payer: COMMERCIAL

## 2022-01-19 ENCOUNTER — TELEPHONE (OUTPATIENT)
Dept: INTERNAL MEDICINE | Facility: CLINIC | Age: 62
End: 2022-01-19

## 2022-01-19 DIAGNOSIS — Z09 NEED FOR IMMUNIZATION FOLLOW-UP: Primary | ICD-10-CM

## 2022-01-19 PROCEDURE — 90750 HZV VACC RECOMBINANT IM: CPT

## 2022-01-19 PROCEDURE — 90471 IMMUNIZATION ADMIN: CPT

## 2022-02-25 ENCOUNTER — LAB REQUISITION (OUTPATIENT)
Dept: LAB | Facility: HOSPITAL | Age: 62
End: 2022-02-25

## 2022-02-25 LAB — SARS-COV-2 RNA RESP QL NAA+PROBE: NEGATIVE

## 2022-02-25 PROCEDURE — U0005 INFEC AGEN DETEC AMPLI PROBE: HCPCS | Performed by: INTERNAL MEDICINE

## 2022-02-28 ENCOUNTER — TRANSFERRED RECORDS (OUTPATIENT)
Dept: HEALTH INFORMATION MANAGEMENT | Facility: CLINIC | Age: 62
End: 2022-02-28

## 2022-02-28 ENCOUNTER — OFFICE VISIT (OUTPATIENT)
Dept: FAMILY MEDICINE | Facility: CLINIC | Age: 62
End: 2022-02-28
Payer: COMMERCIAL

## 2022-02-28 VITALS
OXYGEN SATURATION: 95 % | SYSTOLIC BLOOD PRESSURE: 139 MMHG | DIASTOLIC BLOOD PRESSURE: 80 MMHG | TEMPERATURE: 98 F | RESPIRATION RATE: 18 BRPM | HEART RATE: 110 BPM

## 2022-02-28 DIAGNOSIS — R05.9 COUGH: Primary | ICD-10-CM

## 2022-02-28 DIAGNOSIS — J18.9 PNEUMONIA DUE TO INFECTIOUS ORGANISM, UNSPECIFIED LATERALITY, UNSPECIFIED PART OF LUNG: ICD-10-CM

## 2022-02-28 PROCEDURE — U0003 INFECTIOUS AGENT DETECTION BY NUCLEIC ACID (DNA OR RNA); SEVERE ACUTE RESPIRATORY SYNDROME CORONAVIRUS 2 (SARS-COV-2) (CORONAVIRUS DISEASE [COVID-19]), AMPLIFIED PROBE TECHNIQUE, MAKING USE OF HIGH THROUGHPUT TECHNOLOGIES AS DESCRIBED BY CMS-2020-01-R: HCPCS | Performed by: PREVENTIVE MEDICINE

## 2022-02-28 PROCEDURE — 99215 OFFICE O/P EST HI 40 MIN: CPT | Performed by: PREVENTIVE MEDICINE

## 2022-02-28 PROCEDURE — U0005 INFEC AGEN DETEC AMPLI PROBE: HCPCS | Performed by: PREVENTIVE MEDICINE

## 2022-02-28 RX ORDER — PREDNISONE 20 MG/1
60 TABLET ORAL DAILY
Qty: 15 TABLET | Refills: 0 | Status: SHIPPED | OUTPATIENT
Start: 2022-02-28 | End: 2022-03-05

## 2022-02-28 RX ORDER — PROGESTERONE 100 MG/1
CAPSULE ORAL
COMMUNITY
Start: 2021-12-21 | End: 2024-08-28

## 2022-02-28 RX ORDER — ALBUTEROL SULFATE 90 UG/1
2 AEROSOL, METERED RESPIRATORY (INHALATION) EVERY 6 HOURS PRN
Qty: 18 G | Refills: 0 | Status: SHIPPED | OUTPATIENT
Start: 2022-02-28 | End: 2023-06-12

## 2022-02-28 RX ORDER — DOXYCYCLINE 100 MG/1
100 TABLET ORAL 2 TIMES DAILY
Qty: 14 TABLET | Refills: 0 | Status: SHIPPED | OUTPATIENT
Start: 2022-02-28 | End: 2022-03-07

## 2022-02-28 RX ORDER — BENZONATATE 100 MG/1
100 CAPSULE ORAL 3 TIMES DAILY PRN
Qty: 30 CAPSULE | Refills: 0 | Status: SHIPPED | OUTPATIENT
Start: 2022-02-28 | End: 2023-06-12

## 2022-02-28 NOTE — PROGRESS NOTES
Assessment & Plan     1. Pneumonia due to infectious organism, unspecified laterality, unspecified part of lung  LLL pneumonia  - amoxicillin-clavulanate (AUGMENTIN) 875-125 MG tablet; Take 1 tablet by mouth 2 times daily for 7 days  Dispense: 14 tablet; Refill: 0  - doxycycline monohydrate (ADOXA) 100 MG tablet; Take 1 tablet (100 mg) by mouth 2 times daily for 7 days  Dispense: 14 tablet; Refill: 0    Most consistent with pneumonia and reactive airways  Augmentin and doxycycline for one week  Prednisone for 5 days  Albuterol and tessalon perles for cough  COVID pending  Follow up with pcp in 7 days for recheck or sooner as needed     44 minutes spent on the date of the encounter doing chart review, review of test results, interpretation of tests, patient visit and documentation         No follow-ups on file.    Vicente Jay MD  [facility]    Subjective     Maria Elena Abreu is a 61 year old year old female who presents to clinic today for the following health issues:    Patient presents with:  Cough: x 1wk, SOB only with cough, headaches when coughing alot, no fever    This is a 62 yo female with cough and wheezing over the past week.  Has some post nasal drip as well.  Has not checked her temperature.  Cough is productive or yellow phlegm.  Some shortness of breath when she is coughing.  No cp or pleurisy.  No known sick contacts or travel.        Patient Active Problem List   Diagnosis     HLD (hyperlipidemia)     Menopause     Family history of Alzheimer's disease     Chronic kidney disease, stage 3 (H)       Current Outpatient Medications   Medication     albuterol (PROAIR HFA/PROVENTIL HFA/VENTOLIN HFA) 108 (90 Base) MCG/ACT inhaler     amoxicillin-clavulanate (AUGMENTIN) 875-125 MG tablet     aspirin 81 MG EC tablet     benzonatate (TESSALON) 100 MG capsule     calcium-vitamin D 250-100 mg-unit per tablet     cholecalciferol, vitamin D3, 5,000 unit Tab     clobetasol  (TEMOVATE) 0.05 % external solution     coenzyme Q10 100 mg capsule     doxycycline monohydrate (ADOXA) 100 MG tablet     MULTIVITS-MIN/IRON/FA/LUTEIN (CENTRUM SILVER WOMEN ORAL)     Omega-3-acid Ethyl Esters (FISH OIL OMEGA-3 FATTY ACID) 320MG/ML oral suspension     predniSONE (DELTASONE) 20 MG tablet     progesterone (PROMETRIUM) 100 MG capsule     progesterone (PROMETRIUM) 100 MG capsule     RESVERATROL ORAL     rosuvastatin (CRESTOR) 40 MG tablet     TESTOSTERONE IM     TURMERIC ORAL     No current facility-administered medications for this visit.       No past medical history on file.    Social History   reports that she has never smoked. She has never used smokeless tobacco. She reports that she does not drink alcohol and does not use drugs.    Family History   Problem Relation Age of Onset     Diabetes Mother      Heart Disease Mother      Hypertension Mother      Hyperlipidemia Mother      Osteoporosis Mother      Hyperlipidemia Father      Cerebrovascular Disease Maternal Grandmother      Alzheimer Disease Sister        Review of Systems  Constitutional, HEENT, cardiovascular, pulmonary, GI, , musculoskeletal, neuro, skin, endocrine and psych systems are negative, except as otherwise noted.      Objective    /80 (BP Location: Right arm, Patient Position: Sitting, Cuff Size: Adult Large)   Pulse 110   Temp 98  F (36.7  C)   Resp 18   LMP 07/26/2005 (Exact Date)   SpO2 95%   Breastfeeding No   Physical Exam   GENERAL: healthy, alert and no distress  EYES: Eyes grossly normal to inspection, PERRL and conjunctivae and sclerae normal  HENT: ear canals and TM's normal, nose and mouth without ulcers or lesions  NECK: no adenopathy, no asymmetry, masses, or scars and thyroid normal to palpation  RESP: lungs with expiratory wheezing in all lung fields, crackles at left base.  CV: regular rate and rhythm, normal S1 S2, no S3 or S4, no murmur, click or rub, no peripheral edema and peripheral pulses  strong  ABDOMEN: soft, nontender, no hepatosplenomegaly, no masses and bowel sounds normal  MS: no gross musculoskeletal defects noted, no edema  SKIN: no suspicious lesions or rashes  NEURO: Normal strength and tone, mentation intact and speech normal  PSYCH: mentation appears normal, affect normal/bright    Results for orders placed or performed in visit on 02/28/22 (from the past 24 hour(s))   XR Chest 2 Views    Narrative    EXAM DATE:         02/28/2022    EXAM: X-RAY CHEST, 2 VIEWS, FRONTAL AND LATERAL  LOCATION: [facility]  DATE/TIME: 2/28/2022 12:30 PM    INDICATION: Cough  COMPARISON: None.    IMPRESSION: Lungs are clear. Heart and pulmonary vascularity are normal. No signs of acute disease. Notable hypertrophic spurring in the midthoracic spine.

## 2022-02-28 NOTE — PATIENT INSTRUCTIONS
Pneumonia  Augmentin and doxycycline for one week    Cough  Due to pneumonia and reactive airways.  Prednisone for 5 days  Albuterol inhaler as needed  Tessalon perles as needed    Follow up in 7 days if not improving or sooner if worsening.

## 2022-03-01 LAB — SARS-COV-2 RNA RESP QL NAA+PROBE: NEGATIVE

## 2022-03-03 ENCOUNTER — OFFICE VISIT (OUTPATIENT)
Dept: FAMILY MEDICINE | Facility: CLINIC | Age: 62
End: 2022-03-03
Payer: COMMERCIAL

## 2022-03-03 ENCOUNTER — TELEPHONE (OUTPATIENT)
Dept: INTERNAL MEDICINE | Facility: CLINIC | Age: 62
End: 2022-03-03
Payer: COMMERCIAL

## 2022-03-03 VITALS
WEIGHT: 195.77 LBS | HEART RATE: 87 BPM | BODY MASS INDEX: 33.87 KG/M2 | OXYGEN SATURATION: 97 % | RESPIRATION RATE: 18 BRPM | TEMPERATURE: 97.5 F | SYSTOLIC BLOOD PRESSURE: 139 MMHG | DIASTOLIC BLOOD PRESSURE: 87 MMHG

## 2022-03-03 DIAGNOSIS — J98.01 BRONCHOSPASM: ICD-10-CM

## 2022-03-03 DIAGNOSIS — J20.8 VIRAL BRONCHITIS: Primary | ICD-10-CM

## 2022-03-03 DIAGNOSIS — R05.9 COUGH: ICD-10-CM

## 2022-03-03 PROCEDURE — 99214 OFFICE O/P EST MOD 30 MIN: CPT | Performed by: NURSE PRACTITIONER

## 2022-03-03 RX ORDER — PREDNISONE 20 MG/1
TABLET ORAL
Qty: 6 TABLET | Refills: 0 | Status: SHIPPED | OUTPATIENT
Start: 2022-03-03 | End: 2022-03-07

## 2022-03-03 ASSESSMENT — ENCOUNTER SYMPTOMS
CHILLS: 0
FEVER: 0
PALPITATIONS: 0

## 2022-03-03 NOTE — TELEPHONE ENCOUNTER
Patient calling to request call back from clinical team.  Patient reports that she was seen on Monday in [facility] and had a chest xray done.  Stating that she's now seen the report and the xray was negative for anything concerning.  She's currently being treat since Mondays appt for pneumonia, but if the xray was negative should she change treatment?  Stating that she's not improving with current course of treatment and isn't sure what to do.  Declined appt with PCP at this time, declined nurse triage.  She would like to know what the doctor that she saw on Monday thinks.  Patient can be reached at [phone]. Okay to leave VM if needed.

## 2022-03-03 NOTE — TELEPHONE ENCOUNTER
Called to discuss patient concern regarding negative x-ray with being on antibiotics.  Reports no real improvement in symptoms, but primarily her symptom is postnasal drainage.  Is not particularly winded, but oxygen saturations when she checks them at home are 92 to 93%.    Has been taking prednisone, albuterol inhalers as well.  Does not believe her wheezing has improved.    After discussion of options including stopping antibiotics after 5 days either way since no fevers and not improving with likelihood of viral cause, following up via phone tomorrow with provider who saw her or repeat visit today, patient said she will come over for a repeat visit.    Ju Joe, CNP

## 2022-03-03 NOTE — PATIENT INSTRUCTIONS
Recommend DayQuil and NyQuil as cough suppressants    Unless you are feeling completely better with wheezing, you can continue prednisone for 4 additional days-new prescription sent.  You can pick this up if needed and your original prescription is completed.    X-ray is once again negative.  I do not hear any crackles.    Okay to stop antibiotics after 5 total days of treatment.     Recheck in your clinic in 7 to 10 days with ongoing symptoms.

## 2022-03-03 NOTE — PROGRESS NOTES
Assessment & Plan     Cough    - XR Chest 2 Views  - XR Chest 2 Views    Bronchospasm    - XR Chest 2 Views  - XR Chest 2 Views    Viral bronchitis    - predniSONE (DELTASONE) 20 MG tablet  Dispense: 6 tablet; Refill: 0       Patient here with ongoing symptoms after being seen on the 28th for cough - total of about 10 days of sx so far.  Is wondering if she could stop antibiotics.  She is continuing to be wheezy on recheck with high-pitched expiratory wheezing.    Repeat chest x-ray is negative for pneumonia again.  Okay to stop antibiotics early-we will have her take for a total of 5 days.  Nasal drainage is clear, so do not believe she needs antibiotics for sinusitis either.    Ongoing wheezing.  Will extend her prednisone for 4 additional days with a short taper if she needs it.  Patient can choose to pick this up after 5 days if she is still feeling chest tightness or wheezing.    Increase albuterol to 4 times daily.    OTCss discussed including DayQuil and NyQuil.            No follow-ups on file.    [facility] JANELLE Arredondo is a 61 year old female who presents to clinic today for the following health issues:  Chief Complaint   Patient presents with     Cough     Not feeling better      HPI    Spoke with patient on the phone earlier today.  Patient was being treated for pneumonia with wheezing after visit on February 28.  However, her x-ray was negative for pneumonia.  Patient was wondering if she could stop antibiotics.  She is reporting week and a half of posterior nasal drainage, ongoing wheezing, minimal shortness of breath and no fevers.  + Coughing episodes ongoing.  Nasal drainage is clear to white only.  Had sinus pressure for a couple of days which has resolved.  Does not feel much worse nor much better.     Has an oxygen saturation monitor at home which right her sats between 92 and 95%.  No sats were measured in the 80s.    Last had albuterol at  about 10 AM.  Maybe some temporary relief of symptoms.    Lifelong non-smoker.  No history of asthma.  Does get hayfever occasionally in the fall, but not every fall.        Review of Systems   Constitutional: Negative for chills and fever.   Cardiovascular: Negative for palpitations.           Objective    /87   Pulse 87   Temp 97.5  F (36.4  C) (Tympanic)   Resp 18   Wt 88.8 kg (195 lb 12.3 oz)   LMP 07/26/2005 (Exact Date)   SpO2 97%   BMI 33.87 kg/m    Physical Exam  Constitutional:       General: She is not in acute distress.     Appearance: She is well-developed.   Eyes:      General:         Right eye: No discharge.         Left eye: No discharge.      Conjunctiva/sclera: Conjunctivae normal.   Cardiovascular:      Rate and Rhythm: Normal rate and regular rhythm.      Pulses: Normal pulses.      Heart sounds: Normal heart sounds.   Pulmonary:      Effort: Pulmonary effort is normal. No respiratory distress.      Breath sounds: No stridor. Wheezing (Expiratory wheezing throughout, right greater than left) present. No rhonchi.   Musculoskeletal:         General: Normal range of motion.   Skin:     General: Skin is warm and dry.      Capillary Refill: Capillary refill takes less than 2 seconds.   Neurological:      Mental Status: She is alert and oriented to person, place, and time.   Psychiatric:         Mood and Affect: Mood normal.         Behavior: Behavior normal.         Thought Content: Thought content normal.         Judgment: Judgment normal.            Xray read by Ju Joe CNP: No obvious consolidation consistent with pneumonia    Results for orders placed or performed in visit on 03/03/22 (from the past 24 hour(s))   XR Chest 2 Views    Narrative    EXAM DATE:         03/03/2022    EXAM: X-RAY CHEST, 2 VIEWS, FRONTAL AND LATERAL  LOCATION: [facility]  DATE/TIME: 3/3/2022 5:15 PM    INDICATION: Ongoing cough.  clinically diagnosed with pneumonia  with negative x-ray on the 28th.  COMPARISON: 02/28/2022    IMPRESSION: Mild thoracic scoliosis. Heart size appears normal. Lungs appear clear.

## 2022-08-29 DIAGNOSIS — E78.5 HYPERLIPIDEMIA, UNSPECIFIED HYPERLIPIDEMIA TYPE: ICD-10-CM

## 2022-08-29 NOTE — TELEPHONE ENCOUNTER
"Routing refill request to provider for review/approval because:  Labs not current:  ldl    Last Written Prescription Date:  8/6/21  Last Fill Quantity: 90,  # refills: 3   Last office visit provider:  9/22/21     Requested Prescriptions   Pending Prescriptions Disp Refills     rosuvastatin (CRESTOR) 40 MG tablet 90 tablet 3     Sig: Take 1 tablet (40 mg) by mouth At Bedtime       Statins Protocol Failed - 8/29/2022  2:50 PM        Failed - LDL on file in past 12 months     Recent Labs   Lab Test 08/06/21  0927   LDL 62             Passed - No abnormal creatine kinase in past 12 months     No lab results found.             Passed - Recent (12 mo) or future (30 days) visit within the authorizing provider's specialty     Patient has had an office visit with the authorizing provider or a provider within the authorizing providers department within the previous 12 mos or has a future within next 30 days. See \"Patient Info\" tab in inbasket, or \"Choose Columns\" in Meds & Orders section of the refill encounter.              Passed - Medication is active on med list        Passed - Patient is age 18 or older        Passed - No active pregnancy on record        Passed - No positive pregnancy test in past 12 months             Kristin López, RN 08/29/22 6:17 PM  "

## 2022-08-31 RX ORDER — ROSUVASTATIN CALCIUM 40 MG/1
40 TABLET, COATED ORAL AT BEDTIME
Qty: 30 TABLET | Refills: 0 | Status: SHIPPED | OUTPATIENT
Start: 2022-08-31 | End: 2022-10-12

## 2022-09-25 ENCOUNTER — HEALTH MAINTENANCE LETTER (OUTPATIENT)
Age: 62
End: 2022-09-25

## 2022-10-06 ENCOUNTER — ANCILLARY PROCEDURE (OUTPATIENT)
Dept: MAMMOGRAPHY | Facility: CLINIC | Age: 62
End: 2022-10-06
Attending: NURSE PRACTITIONER
Payer: COMMERCIAL

## 2022-10-06 DIAGNOSIS — Z12.31 VISIT FOR SCREENING MAMMOGRAM: ICD-10-CM

## 2022-10-06 PROCEDURE — 77067 SCR MAMMO BI INCL CAD: CPT

## 2022-11-16 ENCOUNTER — OFFICE VISIT (OUTPATIENT)
Dept: INTERNAL MEDICINE | Facility: CLINIC | Age: 62
End: 2022-11-16
Payer: COMMERCIAL

## 2022-11-16 VITALS
DIASTOLIC BLOOD PRESSURE: 66 MMHG | SYSTOLIC BLOOD PRESSURE: 108 MMHG | RESPIRATION RATE: 18 BRPM | WEIGHT: 204.7 LBS | HEART RATE: 72 BPM | BODY MASS INDEX: 34.95 KG/M2 | HEIGHT: 64 IN

## 2022-11-16 DIAGNOSIS — Z00.00 ANNUAL PHYSICAL EXAM: Primary | ICD-10-CM

## 2022-11-16 DIAGNOSIS — N18.30 STAGE 3 CHRONIC KIDNEY DISEASE, UNSPECIFIED WHETHER STAGE 3A OR 3B CKD (H): ICD-10-CM

## 2022-11-16 DIAGNOSIS — E55.9 VITAMIN D DEFICIENCY: ICD-10-CM

## 2022-11-16 DIAGNOSIS — Z13.220 SCREENING FOR HYPERLIPIDEMIA: ICD-10-CM

## 2022-11-16 DIAGNOSIS — E78.5 HYPERLIPIDEMIA, UNSPECIFIED HYPERLIPIDEMIA TYPE: ICD-10-CM

## 2022-11-16 DIAGNOSIS — R73.01 ELEVATED FASTING GLUCOSE: ICD-10-CM

## 2022-11-16 PROCEDURE — 99396 PREV VISIT EST AGE 40-64: CPT | Performed by: NURSE PRACTITIONER

## 2022-11-16 RX ORDER — ROSUVASTATIN CALCIUM 40 MG/1
40 TABLET, COATED ORAL AT BEDTIME
Qty: 90 TABLET | Refills: 3 | Status: SHIPPED | OUTPATIENT
Start: 2022-11-16 | End: 2024-01-16

## 2022-11-16 ASSESSMENT — ENCOUNTER SYMPTOMS
SHORTNESS OF BREATH: 0
SORE THROAT: 0
CHILLS: 0
DIARRHEA: 0
NERVOUS/ANXIOUS: 0
JOINT SWELLING: 0
BREAST MASS: 0
CONSTIPATION: 0
PALPITATIONS: 0
FEVER: 0
DIZZINESS: 0
FREQUENCY: 0
HEMATURIA: 0
MYALGIAS: 0
NAUSEA: 0
PARESTHESIAS: 0
HEADACHES: 0
EYE PAIN: 0
HEARTBURN: 0
DYSURIA: 0
HEMATOCHEZIA: 0
WEAKNESS: 0
ARTHRALGIAS: 0
COUGH: 0
ABDOMINAL PAIN: 0

## 2022-11-16 ASSESSMENT — PAIN SCALES - GENERAL: PAINLEVEL: NO PAIN (0)

## 2022-11-16 NOTE — PROGRESS NOTES
SUBJECTIVE:   CC: Maria Elena is an 62 year old who presents for preventive health visit.     Patient has been advised of split billing requirements and indicates understanding: Yes  Healthy Habits:     Getting at least 3 servings of Calcium per day:  Yes    Bi-annual eye exam:  Yes    Dental care twice a year:  Yes    Sleep apnea or symptoms of sleep apnea:  None    Diet:  Regular (no restrictions)    Frequency of exercise:  None    Taking medications regularly:  Yes    Medication side effects:  None    PHQ-2 Total Score: 0    Additional concerns today:  No              Today's PHQ-2 Score:   PHQ-2 ( 1999 Pfizer) 11/16/2022   Q1: Little interest or pleasure in doing things 0   Q2: Feeling down, depressed or hopeless 0   PHQ-2 Score 0   PHQ-2 Total Score (12-17 Years)- Positive if 3 or more points; Administer PHQ-A if positive -   Q1: Little interest or pleasure in doing things Not at all   Q2: Feeling down, depressed or hopeless Not at all   PHQ-2 Score 0           Social History     Tobacco Use     Smoking status: Never     Smokeless tobacco: Never   Substance Use Topics     Alcohol use: No         Alcohol Use 11/16/2022   Prescreen: >3 drinks/day or >7 drinks/week? No     Wt Readings from Last 5 Encounters:   11/16/22 92.9 kg (204 lb 11.2 oz)   03/03/22 88.8 kg (195 lb 12.3 oz)   08/06/21 88.8 kg (195 lb 12.8 oz)   10/02/20 87.1 kg (192 lb)   06/22/20 85.3 kg (188 lb)       Reviewed orders with patient.  Reviewed health maintenance and updated orders accordingly - Yes  BP Readings from Last 3 Encounters:   11/16/22 108/66   03/03/22 139/87   02/28/22 139/80    Wt Readings from Last 3 Encounters:   11/16/22 92.9 kg (204 lb 11.2 oz)   03/03/22 88.8 kg (195 lb 12.3 oz)   08/06/21 88.8 kg (195 lb 12.8 oz)                  Patient Active Problem List   Diagnosis     HLD (hyperlipidemia)     Menopause     Family history of Alzheimer's disease     Chronic kidney disease, stage 3 (H)     Past Surgical History:   Procedure  Laterality Date     MAMMOPLASTY AUGMENTATION Bilateral 1989       Social History     Tobacco Use     Smoking status: Never     Smokeless tobacco: Never   Substance Use Topics     Alcohol use: No     Family History   Problem Relation Age of Onset     Diabetes Mother      Heart Disease Mother      Hypertension Mother      Hyperlipidemia Mother      Osteoporosis Mother      Hyperlipidemia Father      Cerebrovascular Disease Maternal Grandmother      Alzheimer Disease Sister          Current Outpatient Medications   Medication Sig Dispense Refill     albuterol (PROAIR HFA/PROVENTIL HFA/VENTOLIN HFA) 108 (90 Base) MCG/ACT inhaler Inhale 2 puffs into the lungs every 6 hours as needed for shortness of breath / dyspnea or wheezing 18 g 0     aspirin 81 MG EC tablet Take 81 mg by mouth 2 times daily        benzonatate (TESSALON) 100 MG capsule Take 1 capsule (100 mg) by mouth 3 times daily as needed for cough 30 capsule 0     calcium-vitamin D 250-100 mg-unit per tablet [CALCIUM-VITAMIN D 250-100 MG-UNIT PER TABLET] Take 2 tablets by mouth 2 (two) times a day.       cholecalciferol, vitamin D3, 5,000 unit Tab Take 10,000 Units by mouth 2 times daily        clobetasol (TEMOVATE) 0.05 % external solution Apply 1 mL topically as needed       coenzyme Q10 100 mg capsule [COENZYME Q10 100 MG CAPSULE] Take 300 mg by mouth daily.       MULTIVITS-MIN/IRON/FA/LUTEIN (CENTRUM SILVER WOMEN ORAL) [MULTIVITS-MIN/IRON/FA/LUTEIN (CENTRUM SILVER WOMEN ORAL)] Take by mouth.       Omega-3-acid Ethyl Esters (FISH OIL OMEGA-3 FATTY ACID) 320MG/ML oral suspension Take 1,200 mg by mouth daily       progesterone (PROMETRIUM) 100 MG capsule        progesterone (PROMETRIUM) 100 MG capsule [PROGESTERONE (PROMETRIUM) 100 MG CAPSULE] Take 100 mg by mouth 3 (three) times a day. 1 in the AM and 2 in the PM.       RESVERATROL ORAL [RESVERATROL ORAL] Take 2 tablets by mouth 2 (two) times a day.       rosuvastatin (CRESTOR) 40 MG tablet Take 1 tablet (40  mg) by mouth At Bedtime 90 tablet 3     TESTOSTERONE IM [TESTOSTERONE IM] Inject 0.2 mL into the shoulder, thigh, or buttocks. About every 2 weeks.       TURMERIC ORAL [TURMERIC ORAL] Take by mouth daily.       Allergies   Allergen Reactions     Ragweed Pollen [Ragweeds] Itching     Recent Labs   Lab Test 21  0927 03/10/21  1255 20  1107 19  1214   A1C 5.6  --  5.6  --    LDL 62  --  65 64   HDL 44*  --  45* 47*   TRIG 152*  --  171* 117   ALT 32  --  24 33   CR 1.03  --  0.98 1.03   GFRESTIMATED 58*  --  58* 55*   GFRESTBLACK  --   --  >60 >60   POTASSIUM  --   --  4.3 4.7   TSH  --  1.59 2.69  --         Breast Cancer Screening:    Breast CA Risk Assessment (FHS-7) 2022   Do you have a family history of breast, colon, or ovarian cancer? No / Unknown         Mammogram Screening: Recommended mammography every 1-2 years with patient discussion and risk factor consideration  Pertinent mammograms are reviewed under the imaging tab.    History of abnormal Pap smear: Last 3 Pap and HPV Results:       Reviewed and updated as needed this visit by clinical staff     Meds            The 10-year ASCVD risk score (Hyadee MALDONADO, et al., 2019) is: 2.9%    Values used to calculate the score:      Age: 62 years      Sex: Female      Is Non- : No      Diabetic: No      Tobacco smoker: No      Systolic Blood Pressure: 108 mmHg      Is BP treated: No      HDL Cholesterol: 44 mg/dL      Total Cholesterol: 136 mg/dL    Reviewed and updated as needed this visit by Provider                 No past medical history on file.   Past Surgical History:   Procedure Laterality Date     MAMMOPLASTY AUGMENTATION Bilateral      OB History    Para Term  AB Living   3 3 3 0 0 0   SAB IAB Ectopic Multiple Live Births   0 0 0 0 0      # Outcome Date GA Lbr Sukhjinder/2nd Weight Sex Delivery Anes PTL Lv   3 Term            2 Term            1 Term                Review of Systems   Constitutional:  Negative for chills and fever.   HENT: Negative for congestion, ear pain, hearing loss and sore throat.    Eyes: Negative for pain and visual disturbance.   Respiratory: Negative for cough and shortness of breath.    Cardiovascular: Negative for chest pain, palpitations and peripheral edema.   Gastrointestinal: Negative for abdominal pain, constipation, diarrhea, heartburn, hematochezia and nausea.   Breasts:  Negative for tenderness, breast mass and discharge.   Genitourinary: Negative for dysuria, frequency, genital sores, hematuria, pelvic pain, urgency, vaginal bleeding and vaginal discharge.   Musculoskeletal: Negative for arthralgias, joint swelling and myalgias.   Skin: Negative for rash.   Neurological: Negative for dizziness, weakness, headaches and paresthesias.   Psychiatric/Behavioral: Negative for mood changes. The patient is not nervous/anxious.           OBJECTIVE:   LMP 07/26/2005 (Exact Date)   Physical Exam  GENERAL APPEARANCE: healthy, alert and no distress  EYES: Eyes grossly normal to inspection, PERRL and conjunctivae and sclerae normal  HENT: ear canals and TM's normal, nose and mouth without ulcers or lesions, oropharynx clear and oral mucous membranes moist  NECK: no adenopathy  RESP: lungs clear to auscultation - no rales, rhonchi or wheezes  CV: regular rate and rhythm, normal S1 S2, no S3 or S4, no murmur, click or rub, no peripheral edema and peripheral pulses strong  ABDOMEN: soft, nontender, no hepatosplenomegaly, no masses and bowel sounds normal  MS: no musculoskeletal defects are noted and gait is age appropriate without ataxia  SKIN: no suspicious lesions or rashes  NEURO: Normal strength and tone, sensory exam grossly normal, mentation intact and speech normal  PSYCH: mentation appears normal and affect normal/bright        ASSESSMENT/PLAN:     Problem List Items Addressed This Visit        Endocrine    HLD (hyperlipidemia)    Relevant Medications    rosuvastatin (CRESTOR) 40 MG  tablet    Other Relevant Orders    CT Coronary Calcium Scan       Urinary    Chronic kidney disease, stage 3 (H)    Relevant Orders    Albumin Random Urine Quantitative with Creat Ratio    CBC with platelets and differential    Comprehensive metabolic panel    US Abdomen Complete   Other Visit Diagnoses     Annual physical exam    -  Primary    Screening for hyperlipidemia        Relevant Orders    Lipid panel reflex to direct LDL Non-fasting    Vitamin D deficiency        Elevated fasting glucose        Relevant Orders    Hemoglobin A1c    BMI 35.0-35.9,adult                    COUNSELING:  Reviewed preventive health counseling, as reflected in patient instructions       Regular exercise       Healthy diet/nutrition       Vision screening       Hearing screening        She reports that she has never smoked. She has never used smokeless tobacco.          Kristel Perez NP  [facility]

## 2022-11-22 ENCOUNTER — LAB (OUTPATIENT)
Dept: LAB | Facility: CLINIC | Age: 62
End: 2022-11-22
Payer: COMMERCIAL

## 2022-11-22 DIAGNOSIS — N18.30 STAGE 3 CHRONIC KIDNEY DISEASE, UNSPECIFIED WHETHER STAGE 3A OR 3B CKD (H): ICD-10-CM

## 2022-11-22 DIAGNOSIS — N18.30 CHRONIC KIDNEY DISEASE, STAGE 3 (H): Primary | ICD-10-CM

## 2022-11-22 DIAGNOSIS — Z13.220 SCREENING FOR HYPERLIPIDEMIA: ICD-10-CM

## 2022-11-22 DIAGNOSIS — R73.01 ELEVATED FASTING GLUCOSE: ICD-10-CM

## 2022-11-22 LAB
ALBUMIN SERPL BCG-MCNC: 4.2 G/DL (ref 3.5–5.2)
ALP SERPL-CCNC: 60 U/L (ref 35–104)
ALT SERPL W P-5'-P-CCNC: 31 U/L (ref 10–35)
ANION GAP SERPL CALCULATED.3IONS-SCNC: 13 MMOL/L (ref 7–15)
AST SERPL W P-5'-P-CCNC: 32 U/L (ref 10–35)
BASOPHILS # BLD AUTO: 0 10E3/UL (ref 0–0.2)
BASOPHILS NFR BLD AUTO: 1 %
BILIRUB SERPL-MCNC: 0.4 MG/DL
BUN SERPL-MCNC: 10.7 MG/DL (ref 8–23)
CALCIUM SERPL-MCNC: 9.1 MG/DL (ref 8.8–10.2)
CHLORIDE SERPL-SCNC: 103 MMOL/L (ref 98–107)
CHOLEST SERPL-MCNC: 162 MG/DL
CREAT SERPL-MCNC: 0.95 MG/DL (ref 0.51–0.95)
CREAT UR-MCNC: 196 MG/DL
DEPRECATED HCO3 PLAS-SCNC: 25 MMOL/L (ref 22–29)
EOSINOPHIL # BLD AUTO: 0.3 10E3/UL (ref 0–0.7)
EOSINOPHIL NFR BLD AUTO: 4 %
ERYTHROCYTE [DISTWIDTH] IN BLOOD BY AUTOMATED COUNT: 12.2 % (ref 10–15)
GFR SERPL CREATININE-BSD FRML MDRD: 67 ML/MIN/1.73M2
GLUCOSE SERPL-MCNC: 107 MG/DL (ref 70–99)
HBA1C MFR BLD: 6 % (ref 0–5.6)
HCT VFR BLD AUTO: 41.7 % (ref 35–47)
HDLC SERPL-MCNC: 46 MG/DL
HGB BLD-MCNC: 13.9 G/DL (ref 11.7–15.7)
IMM GRANULOCYTES # BLD: 0 10E3/UL
IMM GRANULOCYTES NFR BLD: 0 %
LDLC SERPL CALC-MCNC: 65 MG/DL
LYMPHOCYTES # BLD AUTO: 3.7 10E3/UL (ref 0.8–5.3)
LYMPHOCYTES NFR BLD AUTO: 43 %
MCH RBC QN AUTO: 31.4 PG (ref 26.5–33)
MCHC RBC AUTO-ENTMCNC: 33.3 G/DL (ref 31.5–36.5)
MCV RBC AUTO: 94 FL (ref 78–100)
MICROALBUMIN UR-MCNC: <12 MG/L
MICROALBUMIN/CREAT UR: NORMAL MG/G{CREAT}
MONOCYTES # BLD AUTO: 0.7 10E3/UL (ref 0–1.3)
MONOCYTES NFR BLD AUTO: 7 %
NEUTROPHILS # BLD AUTO: 4 10E3/UL (ref 1.6–8.3)
NEUTROPHILS NFR BLD AUTO: 46 %
NONHDLC SERPL-MCNC: 116 MG/DL
PLATELET # BLD AUTO: 309 10E3/UL (ref 150–450)
POTASSIUM SERPL-SCNC: 4.4 MMOL/L (ref 3.4–5.3)
PROT SERPL-MCNC: 6.1 G/DL (ref 6.4–8.3)
RBC # BLD AUTO: 4.42 10E6/UL (ref 3.8–5.2)
SODIUM SERPL-SCNC: 141 MMOL/L (ref 136–145)
TRIGL SERPL-MCNC: 253 MG/DL
WBC # BLD AUTO: 8.8 10E3/UL (ref 4–11)

## 2022-11-22 PROCEDURE — 80061 LIPID PANEL: CPT

## 2022-11-22 PROCEDURE — 36415 COLL VENOUS BLD VENIPUNCTURE: CPT

## 2022-11-22 PROCEDURE — 82043 UR ALBUMIN QUANTITATIVE: CPT

## 2022-11-22 PROCEDURE — 83036 HEMOGLOBIN GLYCOSYLATED A1C: CPT

## 2022-11-22 PROCEDURE — 85025 COMPLETE CBC W/AUTO DIFF WBC: CPT

## 2022-11-22 PROCEDURE — 80053 COMPREHEN METABOLIC PANEL: CPT

## 2023-01-06 ENCOUNTER — HOSPITAL ENCOUNTER (OUTPATIENT)
Dept: CT IMAGING | Facility: CLINIC | Age: 63
Discharge: HOME | End: 2023-01-06
Attending: NURSE PRACTITIONER
Payer: COMMERCIAL

## 2023-01-06 ENCOUNTER — HOSPITAL ENCOUNTER (OUTPATIENT)
Dept: ULTRASOUND IMAGING | Facility: CLINIC | Age: 63
Discharge: HOME | End: 2023-01-06
Attending: NURSE PRACTITIONER
Payer: COMMERCIAL

## 2023-01-06 DIAGNOSIS — N18.30 STAGE 3 CHRONIC KIDNEY DISEASE, UNSPECIFIED WHETHER STAGE 3A OR 3B CKD (H): ICD-10-CM

## 2023-01-06 DIAGNOSIS — E78.5 HYPERLIPIDEMIA, UNSPECIFIED HYPERLIPIDEMIA TYPE: ICD-10-CM

## 2023-01-06 LAB
CV CALCIUM SCORE AGATSTON LM: 0
CV CALCIUM SCORING AGATSON LAD: 40
CV CALCIUM SCORING AGATSTON CX: 0
CV CALCIUM SCORING AGATSTON RCA: 0
CV CALCIUM SCORING AGATSTON TOTAL: 40

## 2023-01-06 PROCEDURE — 76770 US EXAM ABDO BACK WALL COMP: CPT

## 2023-01-06 PROCEDURE — 75571 CT HRT W/O DYE W/CA TEST: CPT

## 2023-01-06 PROCEDURE — 75571 CT HRT W/O DYE W/CA TEST: CPT | Mod: 26 | Performed by: INTERNAL MEDICINE

## 2023-01-12 ENCOUNTER — VIRTUAL VISIT (OUTPATIENT)
Dept: INTERNAL MEDICINE | Facility: CLINIC | Age: 63
End: 2023-01-12
Payer: COMMERCIAL

## 2023-01-12 ENCOUNTER — TRANSFERRED RECORDS (OUTPATIENT)
Dept: HEALTH INFORMATION MANAGEMENT | Facility: CLINIC | Age: 63
End: 2023-01-12

## 2023-01-12 DIAGNOSIS — R93.1 ELEVATED CORONARY ARTERY CALCIUM SCORE: ICD-10-CM

## 2023-01-12 DIAGNOSIS — R73.03 PREDIABETES: Primary | ICD-10-CM

## 2023-01-12 DIAGNOSIS — K76.0 HEPATIC STEATOSIS: ICD-10-CM

## 2023-01-12 PROCEDURE — 99214 OFFICE O/P EST MOD 30 MIN: CPT | Mod: 95 | Performed by: NURSE PRACTITIONER

## 2023-01-12 NOTE — PROGRESS NOTES
"Maria Elena is a 62 year old who is being evaluated via a billable telephone visit.      What phone number would you like to be contacted at? [phone]  How would you like to obtain your AVS? Alan    Distant Location (provider location):  On-site    Assessment & Plan   Problem List Items Addressed This Visit    None  Visit Diagnoses     Prediabetes    -  Primary    Hepatic steatosis             Recommendations for increasing physical activity 30 minutes most days of the week weight loss is encouraged follow low-cholesterol diet continue on cholesterol-lowering medications as prescribed and review elevated CT calcium score of 40.  We will recheck a lipid panel in about 4 months time.  Patient is also provided education material dietary considerations and recommendations.  Right outer edge of breast patient describes some burning. Negative mamm in 10.22.  Patient is scheduled for a scan breast.    Bone Density to also be scheduled .Previous ordered 9.21 though not completed.               BMI:   Estimated body mass index is 35.14 kg/m  as calculated from the following:    Height as of 11/16/22: 1.626 m (5' 4\").    Weight as of 11/16/22: 92.9 kg (204 lb 11.2 oz).   Weight management plan: encourage 30 minutes of physical activity most days of the week  Wt Readings from Last 5 Encounters:   11/16/22 92.9 kg (204 lb 11.2 oz)   03/03/22 88.8 kg (195 lb 12.3 oz)   08/06/21 88.8 kg (195 lb 12.8 oz)   10/02/20 87.1 kg (192 lb)   06/22/20 85.3 kg (188 lb)       MEDICATIONS:   No orders of the defined types were placed in this encounter.    There are no discontinued medications.       - Continue other medications without change  FURTHER TESTING:       - CT chest       - DXA (bone density) scan  Regular exercise    No follow-ups on file.    Kristel Perez NP  [facility]    Nadya Arredondo is a 62 year old, presenting for the following health issues:  No chief complaint on file.      HPI   Reviewed lab " and imaging result    Review of Systems   Unremarkable other than listed above and below         Objective           Vitals:  No vitals were obtained today due to virtual visit.    Physical Exam   healthy, alert and no distress  PSYCH: Alert and oriented times 3; coherent speech, normal   rate and volume, able to articulate logical thoughts, able   to abstract reason, no tangential thoughts, no hallucinations   or delusions  Her affect is normal  RESP: No cough, no audible wheezing, able to talk in full sentences  Remainder of exam unable to be completed due to telephone visits  The 10-year ASCVD risk score (Haydee MALDONADO, et al., 2019) is: 3.1%    Values used to calculate the score:      Age: 62 years      Sex: Female      Is Non- : No      Diabetic: No      Tobacco smoker: No      Systolic Blood Pressure: 108 mmHg      Is BP treated: No      HDL Cholesterol: 46 mg/dL      Total Cholesterol: 162 mg/dL    Hospital Outpatient Visit on 01/06/2023   Component Date Value Ref Range Status     Agatston Score of Left Main 01/06/2023 0   Final     Agatston Score of the Left Anterio* 01/06/2023 40   Final     Agatston Score of Circumflex 01/06/2023 0   Final     Agatston Score of Right Coronary A* 01/06/2023 0   Final     Total Score 01/06/2023 40.00   Final     CT Coronary Calcium Scan    Result Date: 1/6/2023    The total Agatston score is 40. A calcium score in this range places the individual in the 66th percentile when compared to an age and gender matched control group and implies a moderate risk of cardiac events in the next ten years.      Radiologist Consult For Cardiology    Result Date: 1/6/2023  OVERREAD: DETAILED [place] [facility] EXTRACARDIAC OVERREAD OF CARDIAC CT LOCATION: [facility] DATE/TIME: 1/6/2023 10:17 AM INDICATION:  Hyperlipidemia, unspecified hyperlipidemia type. TECHNIQUE: Dose reduction techniques were used. COMPARISON: None. FINDINGS:  LIMITED CHEST:  Breast prosthetics. LIMITED MEDIASTINUM: Negative. LIMITED UPPER ABDOMEN: Mild hepatic steatosis.     IMPRESSION:  1.  Hepatic steatosis. 2.  Please refer to cardiologist's dictation for the cardiac CT report.    US Renal Complete Non-Vascular    Result Date: 1/6/2023  EXAM: US RENAL COMPLETE NON-VASCULAR LOCATION: [facility] DATE/TIME: 1/6/2023 10:40 AM INDICATION:  Stage 3 chronic kidney disease, unspecified whether stage 3a or 3b CKD (H) COMPARISON: None. TECHNIQUE: Routine Bilateral Renal and Bladder Ultrasound. FINDINGS: RIGHT KIDNEY: 10.1 x 5.6 x 4.7 cm. Normal without hydronephrosis or masses. LEFT KIDNEY: 10.9 x 4.2 x 5.1 cm. Normal without hydronephrosis or masses. BLADDER: Normal.     IMPRESSION: 1.  Normal kidney ultrasound.      The 10-year ASCVD risk score (Haydee MALDONADO, et al., 2019) is: 3.1%    Values used to calculate the score:      Age: 62 years      Sex: Female      Is Non- : No      Diabetic: No      Tobacco smoker: No      Systolic Blood Pressure: 108 mmHg      Is BP treated: No      HDL Cholesterol: 46 mg/dL      Total Cholesterol: 162 mg/dL            Phone call duration: 16 minutes

## 2023-01-31 ENCOUNTER — HOSPITAL ENCOUNTER (OUTPATIENT)
Dept: BONE DENSITY | Facility: HOSPITAL | Age: 63
Discharge: HOME | End: 2023-01-31
Attending: OBSTETRICS & GYNECOLOGY
Payer: COMMERCIAL

## 2023-01-31 ENCOUNTER — ANCILLARY PROCEDURE (OUTPATIENT)
Dept: MAMMOGRAPHY | Facility: CLINIC | Age: 63
End: 2023-01-31
Attending: OBSTETRICS & GYNECOLOGY
Payer: COMMERCIAL

## 2023-01-31 DIAGNOSIS — R20.8 BURNING SENSATION: ICD-10-CM

## 2023-01-31 DIAGNOSIS — N64.4 BREAST PAIN: ICD-10-CM

## 2023-01-31 DIAGNOSIS — N64.59 BREAST THICKENING: ICD-10-CM

## 2023-01-31 DIAGNOSIS — N95.1 MENOPAUSAL SYMPTOMS: ICD-10-CM

## 2023-01-31 PROCEDURE — 76642 ULTRASOUND BREAST LIMITED: CPT | Mod: RT

## 2023-01-31 PROCEDURE — 77080 DXA BONE DENSITY AXIAL: CPT

## 2023-06-06 NOTE — PROGRESS NOTES
Chief Complaint   Patient presents with     Cough     cough, congestion,  positive to strep- had sore throat(not currently)  rapid strep per pt request       HPI:    Patient is here for 2 wks of a mild cough with chest congestion, and a sore throat. Today her  was tested positive for strep throat. No fever, chills, chest pain, shortness of breath.     ROS: Pertinent ROS noted in HPI.     No Known Allergies    Patient Active Problem List   Diagnosis     Hyperlipidemia     Menopause       No family history on file.    Social History     Social History     Marital status:      Spouse name: N/A     Number of children: N/A     Years of education: N/A     Occupational History     Not on file.     Social History Main Topics     Smoking status: Never Smoker     Smokeless tobacco: Never Used     Alcohol use Not on file     Drug use: Not on file     Sexual activity: Not on file     Other Topics Concern     Not on file     Social History Narrative         Objective:    Vitals:    03/24/17 1449   BP: 130/80   Pulse: 76   Resp: 18   Temp: 98.1  F (36.7  C)   SpO2: 96%       Gen:NAD  Throat; normal  Neck: no adenopathy  CV: RRR, no M, R, G  Pulm: CTAB    Recent Results (from the past 24 hour(s))   Rapid Strep A Screen-Throat   Result Value Ref Range    Rapid Strep A Antigen No Group A Strep detected No Group A Strep detected           Exposure to strep throat - as her  just started treatment today, she will be treated based on exposure risks.   -     Rapid Strep A Screen-Throat  -     Group A Strep, RNA Direct Detection, Throat  -     amoxicillin (AMOXIL) 875 MG tablet; Take 1 tablet (875 mg total) by mouth 2 (two) times a day for 10 days.            
Results discussed during visit.
Event Note

## 2023-06-12 ENCOUNTER — OFFICE VISIT (OUTPATIENT)
Dept: FAMILY MEDICINE | Facility: CLINIC | Age: 63
End: 2023-06-12
Payer: COMMERCIAL

## 2023-06-12 VITALS
OXYGEN SATURATION: 95 % | DIASTOLIC BLOOD PRESSURE: 84 MMHG | SYSTOLIC BLOOD PRESSURE: 133 MMHG | HEART RATE: 87 BPM | TEMPERATURE: 98.2 F | RESPIRATION RATE: 16 BRPM

## 2023-06-12 DIAGNOSIS — H60.391 INFECTIVE OTITIS EXTERNA, RIGHT: Primary | ICD-10-CM

## 2023-06-12 DIAGNOSIS — J30.2 SEASONAL ALLERGIC RHINITIS, UNSPECIFIED TRIGGER: ICD-10-CM

## 2023-06-12 PROCEDURE — 99213 OFFICE O/P EST LOW 20 MIN: CPT | Mod: 25 | Performed by: NURSE PRACTITIONER

## 2023-06-12 PROCEDURE — 69209 REMOVE IMPACTED EAR WAX UNI: CPT | Mod: RT | Performed by: NURSE PRACTITIONER

## 2023-06-12 RX ORDER — OFLOXACIN 3 MG/ML
10 SOLUTION AURICULAR (OTIC) DAILY
Qty: 4 ML | Refills: 0 | Status: SHIPPED | OUTPATIENT
Start: 2023-06-12 | End: 2023-06-19

## 2023-06-12 RX ORDER — FLUTICASONE PROPIONATE 50 MCG
2 SPRAY, SUSPENSION (ML) NASAL DAILY
Qty: 16 G | Refills: 4 | Status: SHIPPED | OUTPATIENT
Start: 2023-06-12 | End: 2023-07-12

## 2023-06-12 ASSESSMENT — ENCOUNTER SYMPTOMS
COUGH: 0
FEVER: 0

## 2023-06-12 NOTE — PROGRESS NOTES
Assessment & Plan     Seasonal allergic rhinitis, unspecified trigger    - fluticasone (FLONASE) 50 MCG/ACT nasal spray  Dispense: 16 g; Refill: 4    Infective otitis externa, right    - ofloxacin (FLOXIN) 0.3 % otic solution  Dispense: 4 mL; Refill: 0     Patient with some persistent postnasal drainage, congestion with history of seasonal allergies.   Taking Claritin.  Since symptoms are ongoing, will add Flonase.      Some pruritus of the ear.  Has been using Q-tips.      Hydrocortisone 1% as needed twice daily to the edge of the ear canal if it is pruritic.    Right ear pain.  History of what sounds like otitis externa.  Used an old eardrop for 3 to 4 days with improvement of some of the symptoms, subtotal.  We will add ofloxacin for this.  There is an area of redness that she says was much more swollen prior to using old eardrop.    Keep ears dry.  Avoid Q-tips.     Follow-up if not much better in a few days.          Return in about 4 days (around 2023) for If no better.    Ju Joe, [facility] JANELLE Arredondo is a 63 year old female who presents to clinic today for the following health issues:  Chief Complaint   Patient presents with     Ear Problem     Right ear pain and pressure x 1 week. Does have sore throat but thinks it is allergy related. Sx x 1 month. Has had hx of right ear problems (Dry patch). Gets open sores inside ear and has developed into cellulitis. Using  antibiotic drops and has gone done some with drops. Feels like it moved down further. Headaches. Ears feel full.      HPI    Patient with allergy symptoms and postnasal drainage stated with sore throat which she attributes to the postnasal drainage for about a month with right ear plugging and discomfort.  At times will get canal swelling and cellulitis.     Patient had some  antibiotic eardrops which she used which improved some of the swelling.     Has not been  swimming.          Review of Systems   Constitutional: Negative for fever.   HENT: Positive for congestion, ear pain and postnasal drip.    Respiratory: Negative for cough.            Objective    /84 (BP Location: Right arm, Patient Position: Sitting, Cuff Size: Adult Regular)   Pulse 87   Temp 98.2  F (36.8  C) (Tympanic)   Resp 16   LMP 07/26/2005 (Exact Date)   SpO2 95%   Physical Exam  HENT:      Right Ear: Tympanic membrane normal. There is impacted cerumen.      Left Ear: Tympanic membrane normal.      Ears:      Comments: Irritated, red patch at the right ear canal opening, tender.  Patient indicates that this was the area that was very swollen when she started taking eardrops.     Nose: Congestion present.   Eyes:      Conjunctiva/sclera: Conjunctivae normal.   Pulmonary:      Effort: Pulmonary effort is normal.   Psychiatric:         Mood and Affect: Mood normal.

## 2023-06-12 NOTE — PATIENT INSTRUCTIONS
Going to treat you for mild ear canal infection.  Use the ofloxacin drops daily.    Start Ofloxacin for swimmer's ear.    Lie with right side up side for 60 seconds after using the drops.    If there is an itchy patch in your ear canal that you feel like you need to scratch, you may use a little bit of 1% hydrocortisone apply gently twice a day as needed.    Start Flonase 2 sprays in each nostril daily, gentle sniffs, for allergies.  Continue Claritin for now.  If still having drainage after both of these and the drainage is thick and or yellow or green, you will want to do an E-visit to discuss antibiotics.      Tylenol or ibuprofen as needed for pain.    Keep ear dry.  Do not dunk underwater or swim until drops are completed.  You may use a cotton ball and avoid water directly in the ear with baths.    Avoid use of Q-tips as this can scratch the ear canal and cause infection.

## 2023-06-12 NOTE — PROCEDURES
Cerumen removal:     Ear flush was used to remove cerumen from right ear(s) by MA    No immediate complications noted.      Ju Joe, CNP

## 2023-06-22 ENCOUNTER — MEDICAL CORRESPONDENCE (OUTPATIENT)
Dept: TRANSPLANT | Facility: CLINIC | Age: 63
End: 2023-06-22
Payer: COMMERCIAL

## 2023-10-12 ENCOUNTER — ANCILLARY PROCEDURE (OUTPATIENT)
Dept: MAMMOGRAPHY | Facility: HOSPITAL | Age: 63
End: 2023-10-12
Payer: COMMERCIAL

## 2023-10-12 DIAGNOSIS — Z12.31 ENCOUNTER FOR SCREENING MAMMOGRAM FOR BREAST CANCER: ICD-10-CM

## 2023-10-12 PROCEDURE — 77063 BREAST TOMOSYNTHESIS BI: CPT

## 2023-10-27 ENCOUNTER — OFFICE VISIT (OUTPATIENT)
Dept: FAMILY MEDICINE | Facility: CLINIC | Age: 63
End: 2023-10-27
Payer: COMMERCIAL

## 2023-10-27 VITALS
BODY MASS INDEX: 34.79 KG/M2 | TEMPERATURE: 98.1 F | HEART RATE: 93 BPM | DIASTOLIC BLOOD PRESSURE: 85 MMHG | OXYGEN SATURATION: 100 % | SYSTOLIC BLOOD PRESSURE: 130 MMHG | RESPIRATION RATE: 16 BRPM | WEIGHT: 203.8 LBS | HEIGHT: 64 IN

## 2023-10-27 DIAGNOSIS — R73.03 PREDIABETES: ICD-10-CM

## 2023-10-27 DIAGNOSIS — R30.9 PAINFUL URINATION: ICD-10-CM

## 2023-10-27 DIAGNOSIS — Z23 ENCOUNTER FOR IMMUNIZATION: ICD-10-CM

## 2023-10-27 DIAGNOSIS — N30.00 ACUTE CYSTITIS WITHOUT HEMATURIA: Primary | ICD-10-CM

## 2023-10-27 DIAGNOSIS — N18.30 STAGE 3 CHRONIC KIDNEY DISEASE, UNSPECIFIED WHETHER STAGE 3A OR 3B CKD (H): ICD-10-CM

## 2023-10-27 LAB
ALBUMIN UR-MCNC: 30 MG/DL
ANION GAP SERPL CALCULATED.3IONS-SCNC: 12 MMOL/L (ref 7–15)
APPEARANCE UR: CLEAR
BILIRUB UR QL STRIP: NEGATIVE
BUN SERPL-MCNC: 12.3 MG/DL (ref 8–23)
CALCIUM SERPL-MCNC: 10 MG/DL (ref 8.8–10.2)
CHLORIDE SERPL-SCNC: 103 MMOL/L (ref 98–107)
CLUE CELLS: ABNORMAL
COLOR UR AUTO: YELLOW
CREAT SERPL-MCNC: 1.01 MG/DL (ref 0.51–0.95)
DEPRECATED HCO3 PLAS-SCNC: 27 MMOL/L (ref 22–29)
EGFRCR SERPLBLD CKD-EPI 2021: 62 ML/MIN/1.73M2
GLUCOSE SERPL-MCNC: 116 MG/DL (ref 70–99)
GLUCOSE UR STRIP-MCNC: NEGATIVE MG/DL
HBA1C MFR BLD: 6.1 % (ref 0–5.6)
HGB UR QL STRIP: ABNORMAL
KETONES UR STRIP-MCNC: NEGATIVE MG/DL
LEUKOCYTE ESTERASE UR QL STRIP: ABNORMAL
NITRATE UR QL: POSITIVE
PH UR STRIP: 5.5 [PH] (ref 5–8)
POTASSIUM SERPL-SCNC: 4.6 MMOL/L (ref 3.4–5.3)
SODIUM SERPL-SCNC: 142 MMOL/L (ref 135–145)
SP GR UR STRIP: 1.02 (ref 1–1.03)
TRICHOMONAS, WET PREP: ABNORMAL
UROBILINOGEN UR STRIP-ACNC: 0.2 E.U./DL
WBC'S/HIGH POWER FIELD, WET PREP: ABNORMAL
YEAST, WET PREP: ABNORMAL

## 2023-10-27 PROCEDURE — 87210 SMEAR WET MOUNT SALINE/INK: CPT | Performed by: PHYSICIAN ASSISTANT

## 2023-10-27 PROCEDURE — 80048 BASIC METABOLIC PNL TOTAL CA: CPT | Performed by: PHYSICIAN ASSISTANT

## 2023-10-27 PROCEDURE — 87086 URINE CULTURE/COLONY COUNT: CPT | Performed by: PHYSICIAN ASSISTANT

## 2023-10-27 PROCEDURE — 36415 COLL VENOUS BLD VENIPUNCTURE: CPT | Performed by: PHYSICIAN ASSISTANT

## 2023-10-27 PROCEDURE — 90471 IMMUNIZATION ADMIN: CPT | Performed by: PHYSICIAN ASSISTANT

## 2023-10-27 PROCEDURE — 99214 OFFICE O/P EST MOD 30 MIN: CPT | Mod: 25 | Performed by: PHYSICIAN ASSISTANT

## 2023-10-27 PROCEDURE — 90677 PCV20 VACCINE IM: CPT | Performed by: PHYSICIAN ASSISTANT

## 2023-10-27 PROCEDURE — 83036 HEMOGLOBIN GLYCOSYLATED A1C: CPT | Performed by: PHYSICIAN ASSISTANT

## 2023-10-27 PROCEDURE — 81003 URINALYSIS AUTO W/O SCOPE: CPT | Performed by: PHYSICIAN ASSISTANT

## 2023-10-27 PROCEDURE — 87186 SC STD MICRODIL/AGAR DIL: CPT | Performed by: PHYSICIAN ASSISTANT

## 2023-10-27 RX ORDER — FLUCONAZOLE 150 MG/1
150 TABLET ORAL ONCE
Qty: 1 TABLET | Refills: 0 | Status: SHIPPED | OUTPATIENT
Start: 2023-10-27 | End: 2023-10-27

## 2023-10-27 RX ORDER — NITROFURANTOIN 25; 75 MG/1; MG/1
100 CAPSULE ORAL 2 TIMES DAILY
Qty: 10 CAPSULE | Refills: 0 | Status: SHIPPED | OUTPATIENT
Start: 2023-10-27 | End: 2023-11-01

## 2023-10-27 NOTE — PROGRESS NOTES
"  Assessment & Plan     Acute cystitis without hematuria  Painful urination  - nitroFURantoin macrocrystal-monohydrate (MACROBID) 100 MG capsule; Take 1 capsule (100 mg) by mouth 2 times daily for 5 days  - fluconazole (DIFLUCAN) 150 MG tablet; Take 1 tablet (150 mg) by mouth once for 1 dose  - UA Macroscopic with reflex to Microscopic and Culture - Clinic Collect  - Wet prep - Clinic Collect  - Urine Culture  New problem, UA today does reveal infection, will treat with Macrobid BID x 5 days. UC sent.  Wet prep-negative for infection but patient is prone to yeast with taking antibiotics thus one dose of Diflucan sent in.    Stage 3 chronic kidney disease, unspecified whether stage 3a or 3b CKD (H)  Chronic issue, recheck BMP today. Encouraged fluid intake.  - Basic metabolic panel  (Ca, Cl, CO2, Creat, Gluc, K, Na, BUN); Future  - Basic metabolic panel  (Ca, Cl, CO2, Creat, Gluc, K, Na, BUN)    Prediabetes  Will recheck A1C today.  - Hemoglobin A1c; Future  - Hemoglobin A1c    Encounter for immunization  - PNEUMOCOCCAL 20 VALENT CONJUGATE (PREVNAR 20)       BMI:   Estimated body mass index is 34.98 kg/m  as calculated from the following:    Height as of this encounter: 1.626 m (5' 4\").    Weight as of this encounter: 92.4 kg (203 lb 12.8 oz).   Weight management plan: Patient was referred to their PCP to discuss a diet and exercise plan.    Risks, benefits and alternatives were discussed with patient. Agreeable to the plan of care.  Encouraged to schedule physical with PCP.    Lisa Pacheco PA-C  [facility]    Nadya Arredondo is a 63 year old, presenting for the following health issues:  UTI (Itching and burning sensation x 2-3 days/)        10/27/2023    10:43 AM   Additional Questions   Roomed by Saba BOWIE CMA       History of Present Illness       Reason for visit:  ?uti/?yeast inf  Symptom onset:  1-3 days ago  Symptoms include:  Burning, itching etc  Symptom intensity: " " Mild  Symptom progression:  Staying the same  Had these symptoms before:  Yes  Has tried/received treatment for these symptoms:  Yes  Previous treatment was successful:  Yes  Prior treatment description:  Antibiotics, creams etc  What makes it worse:  Urinating  What makes it better:  No    She eats 2-3 servings of fruits and vegetables daily.She consumes 0 sweetened beverage(s) daily.She exercises with enough effort to increase her heart rate 10 to 19 minutes per day.  She exercises with enough effort to increase her heart rate 3 or less days per week.   She is taking medications regularly.         Patient is here today for evaluation of ongoing burning with urination for 2-3 days  No blood in urine, no abdominal pain, questions flank pain or MSK from CPR class  She notes some minor external itching- wonders if may have yeast infection as well  Taking nothing for this        Review of Systems   Constitutional, HEENT, cardiovascular, pulmonary, gi and gu systems are negative, except as otherwise noted.      Objective    /85 (BP Location: Left arm, Patient Position: Sitting, Cuff Size: Adult Large)   Pulse 93   Temp 98.1  F (36.7  C) (Oral)   Resp 16   Ht 1.626 m (5' 4\")   Wt 92.4 kg (203 lb 12.8 oz)   LMP 07/26/2005 (Exact Date)   SpO2 100%   BMI 34.98 kg/m    Body mass index is 34.98 kg/m .  Physical Exam   GENERAL: healthy, alert and no distress  NECK: no adenopathy, no asymmetry, masses, or scars and thyroid normal to palpation  RESP: lungs clear to auscultation - no rales, rhonchi or wheezes  CV: regular rate and rhythm, normal S1 S2, no S3 or S4, no murmur, click or rub, no peripheral edema and peripheral pulses strong  ABDOMEN: soft, nontender, no hepatosplenomegaly, no masses and bowel sounds normal  MS: no gross musculoskeletal defects noted, no edema    Hospital Outpatient Visit on 01/06/2023   Component Date Value Ref Range Status    Agatston Score of Left Main 01/06/2023 0   Final    " Agatston Score of the Left Anterio* 01/06/2023 40   Final    Agatston Score of Circumflex 01/06/2023 0   Final    Agatston Score of Right Coronary A* 01/06/2023 0   Final    Total Score 01/06/2023 40.00   Final

## 2023-10-29 LAB — BACTERIA UR CULT: ABNORMAL

## 2023-12-23 ENCOUNTER — HEALTH MAINTENANCE LETTER (OUTPATIENT)
Age: 63
End: 2023-12-23

## 2024-01-16 DIAGNOSIS — E78.5 HYPERLIPIDEMIA, UNSPECIFIED HYPERLIPIDEMIA TYPE: ICD-10-CM

## 2024-01-16 NOTE — TELEPHONE ENCOUNTER
Refill request for rosuvastatin       Next appt in April       Alex Little Jr., CMA on 1/16/2024 at 3:24 PM

## 2024-01-17 RX ORDER — ROSUVASTATIN CALCIUM 40 MG/1
40 TABLET, COATED ORAL AT BEDTIME
Qty: 90 TABLET | Refills: 3 | Status: SHIPPED | OUTPATIENT
Start: 2024-01-17

## 2024-03-12 ENCOUNTER — OFFICE VISIT (OUTPATIENT)
Dept: FAMILY MEDICINE | Facility: CLINIC | Age: 64
End: 2024-03-12
Payer: COMMERCIAL

## 2024-03-12 VITALS
RESPIRATION RATE: 16 BRPM | SYSTOLIC BLOOD PRESSURE: 138 MMHG | TEMPERATURE: 99 F | HEART RATE: 92 BPM | WEIGHT: 180 LBS | DIASTOLIC BLOOD PRESSURE: 86 MMHG | BODY MASS INDEX: 30.73 KG/M2 | HEIGHT: 64 IN

## 2024-03-12 DIAGNOSIS — B37.49 YEAST UTI: ICD-10-CM

## 2024-03-12 DIAGNOSIS — R30.0 DYSURIA: ICD-10-CM

## 2024-03-12 DIAGNOSIS — N39.0 ACUTE UTI (URINARY TRACT INFECTION): Primary | ICD-10-CM

## 2024-03-12 LAB
ALBUMIN UR-MCNC: 100 MG/DL
APPEARANCE UR: ABNORMAL
BACTERIA #/AREA URNS HPF: ABNORMAL /HPF
BILIRUB UR QL STRIP: NEGATIVE
COLOR UR AUTO: YELLOW
GLUCOSE UR STRIP-MCNC: NEGATIVE MG/DL
HGB UR QL STRIP: ABNORMAL
KETONES UR STRIP-MCNC: NEGATIVE MG/DL
LEUKOCYTE ESTERASE UR QL STRIP: ABNORMAL
NITRATE UR QL: NEGATIVE
PH UR STRIP: 5.5 [PH] (ref 5–8)
RBC #/AREA URNS AUTO: ABNORMAL /HPF
SP GR UR STRIP: >=1.03 (ref 1–1.03)
SQUAMOUS #/AREA URNS AUTO: ABNORMAL /LPF
UROBILINOGEN UR STRIP-ACNC: 0.2 E.U./DL
WBC #/AREA URNS AUTO: >100 /HPF

## 2024-03-12 PROCEDURE — 87086 URINE CULTURE/COLONY COUNT: CPT

## 2024-03-12 PROCEDURE — 87088 URINE BACTERIA CULTURE: CPT

## 2024-03-12 PROCEDURE — 99213 OFFICE O/P EST LOW 20 MIN: CPT

## 2024-03-12 PROCEDURE — 81001 URINALYSIS AUTO W/SCOPE: CPT

## 2024-03-12 PROCEDURE — 87186 SC STD MICRODIL/AGAR DIL: CPT

## 2024-03-12 RX ORDER — NITROFURANTOIN 25; 75 MG/1; MG/1
100 CAPSULE ORAL 2 TIMES DAILY
Qty: 10 CAPSULE | Refills: 0 | Status: SHIPPED | OUTPATIENT
Start: 2024-03-12 | End: 2024-03-17

## 2024-03-12 RX ORDER — FLUCONAZOLE 150 MG/1
150 TABLET ORAL ONCE
Qty: 1 TABLET | Refills: 0 | Status: SHIPPED | OUTPATIENT
Start: 2024-03-12 | End: 2024-03-12

## 2024-03-12 NOTE — PROGRESS NOTES
Assessment & Plan     Acute UTI (urinary tract infection)  Reviewed urine analysis with patient in clinic, positive for blood, leukocytes, protein albumin but negative for nitrates.  Discussed with patient that she could wait for urine culture and drink lots of fluids to see if urinary symptoms resolve and may be related to last night sexual intercourse causing urethritis.  Patient would like to start antibiotics as symptoms feel like previous UTIs.  Patient has been on Macrobid prior with resolution of UTI symptoms.  Patient is afebrile, nontoxic-appearing, no CVA tenderness, or flank pain, have low suspicion for pyelonephritis or kidney stones.  - nitroFURantoin macrocrystal-monohydrate (MACROBID) 100 MG capsule; Take 1 capsule (100 mg) by mouth 2 times daily for 5 days    Dysuria  - UA Macroscopic with reflex to Microscopic and Culture - Lab Collect  - Urine Microscopic Exam  - Urine Culture    Yeast UTI  Patient reports she developed yeast infections after antibiotic use.  Sent prescription if needed.  - fluconazole (DIFLUCAN) 150 MG tablet; Take 1 tablet (150 mg) by mouth once for 1 dose    Nadya Arredondo is a 63 year old, presenting for the following health issues:  Dysuria (W/ retention. Denies fevers, back pain.)        10/27/2023    10:43 AM   Additional Questions   Roomed by Saba BOWIE CMA     HPI     Patient 63 old female presents for 1 day of dysuria, cloudy urine, urinary retention, slight nausea.  Temperature at home was 99 oral.  Denies flank pain, no history of kidney stones.  No vaginal discharge.  Last UTI 10/20/2023 with urine culture showing E. coli treated with Macrobid which resolved urinary symptoms.  Patient states current symptoms are similar to previous UTI symptoms.  Patient did have sexual intercourse last night.  History of UTIs post sexual intercourse.  No concerns of STI.    PAST MEDICAL HISTORY: No past medical history on file.    PAST SURGICAL HISTORY:   Past Surgical History:    Procedure Laterality Date    MAMMOPLASTY AUGMENTATION Bilateral 1989     FAMILY HISTORY:   Family History   Problem Relation Age of Onset    Diabetes Mother     Heart Disease Mother     Hypertension Mother     Hyperlipidemia Mother     Osteoporosis Mother     Hyperlipidemia Father     Cerebrovascular Disease Maternal Grandmother     Alzheimer Disease Sister      SOCIAL HISTORY:   Social History     Tobacco Use    Smoking status: Never    Smokeless tobacco: Never   Substance Use Topics    Alcohol use: No     Review of Systems  Review of systems negative otherwise noted in HPI.      Objective    LMP 07/26/2005 (Exact Date)   There is no height or weight on file to calculate BMI.  Physical Exam   General: Alert, oriented, no acute distress.    Eyes: Normal external eye, conjunctiva, and lids.   Respiratory: Lungs clear, unlabored. No rales, rhonchi, or wheezes.    Cardiovascular: Regular rate and rhythm, S1 and S2. No murmurs.   Gastrointestinal: Normoactive bowel sounds. Soft, nontender, no organomegaly.  No CVA tenderness.  Neurologic: No gross motor or sensory deficit. Mentation intact and speech normal.       Signed Electronically by: ESSIE Benson CNP

## 2024-03-12 NOTE — PATIENT INSTRUCTIONS
Urinary Tract Infection (UTI) in Women: Care Instructions  Overview     A urinary tract infection, or UTI, is a general term for an infection anywhere between the kidneys and the urethra (where urine comes out). Most UTIs are bladder infections. They often cause pain or burning when you urinate.  UTIs are caused by bacteria and can be cured with antibiotics. Be sure to complete your treatment so that the infection does not get worse.  Follow-up care is a key part of your treatment and safety. Be sure to make and go to all appointments, and call your doctor if you are having problems. It's also a good idea to know your test results and keep a list of the medicines you take.  How can you care for yourself at home?  Take your antibiotics as directed. Do not stop taking them just because you feel better. You need to take the full course of antibiotics.  Drink extra water and other fluids for the next day or two. This will help make the urine less concentrated and help wash out the bacteria that are causing the infection. (If you have kidney, heart, or liver disease and have to limit fluids, talk with your doctor before you increase the amount of fluids you drink.)  Avoid drinks that are carbonated or have caffeine. They can irritate the bladder.  Urinate often. Try to empty your bladder each time.  To relieve pain, take a hot bath or lay a heating pad set on low over your lower belly or genital area. Never go to sleep with a heating pad in place.  To prevent UTIs  Drink plenty of water each day. This helps you urinate often, which clears bacteria from your system. (If you have kidney, heart, or liver disease and have to limit fluids, talk with your doctor before you increase the amount of fluids you drink.)  Urinate when you need to.  If you are sexually active, urinate right after you have sex.  Change sanitary pads often.  Avoid douches, bubble baths, feminine hygiene sprays, and other feminine hygiene products that  "have deodorants.  After going to the bathroom, wipe from front to back.  When should you call for help?   Call your doctor now or seek immediate medical care if:    Symptoms such as fever, chills, nausea, or vomiting get worse or appear for the first time.     You have new pain in your back just below your rib cage. This is called flank pain.     There is new blood or pus in your urine.     You have any problems with your antibiotic medicine.   Watch closely for changes in your health, and be sure to contact your doctor if:    You are not getting better after taking an antibiotic for 2 days.     Your symptoms go away but then come back.   Where can you learn more?  Go to https://[email or web address].net/patiented  Enter K848 in the search box to learn more about \"Urinary Tract Infection (UTI) in Women: Care Instructions.\"  Current as of: February 28, 2023               Content Version: 13.8    1666-5852 [employer].   Care instructions adapted under license by your healthcare professional. If you have questions about a medical condition or this instruction, always ask your healthcare professional. [employer] disclaims any warranty or liability for your use of this information.      "

## 2024-03-13 LAB
BACTERIA UR CULT: ABNORMAL
BACTERIA UR CULT: ABNORMAL

## 2024-03-27 ENCOUNTER — TRANSFERRED RECORDS (OUTPATIENT)
Dept: MULTI SPECIALTY CLINIC | Facility: CLINIC | Age: 64
End: 2024-03-27
Payer: COMMERCIAL

## 2024-03-27 LAB — PAP SMEAR - HIM PATIENT REPORTED: NORMAL

## 2024-04-04 ENCOUNTER — OFFICE VISIT (OUTPATIENT)
Dept: INTERNAL MEDICINE | Facility: CLINIC | Age: 64
End: 2024-04-04
Payer: COMMERCIAL

## 2024-04-04 VITALS
RESPIRATION RATE: 16 BRPM | BODY MASS INDEX: 35.39 KG/M2 | OXYGEN SATURATION: 97 % | HEART RATE: 91 BPM | HEIGHT: 64 IN | WEIGHT: 207.3 LBS | DIASTOLIC BLOOD PRESSURE: 82 MMHG | SYSTOLIC BLOOD PRESSURE: 136 MMHG

## 2024-04-04 DIAGNOSIS — Z00.00 ANNUAL PHYSICAL EXAM: ICD-10-CM

## 2024-04-04 DIAGNOSIS — E66.812 CLASS 2 SEVERE OBESITY DUE TO EXCESS CALORIES WITH SERIOUS COMORBIDITY AND BODY MASS INDEX (BMI) OF 36.0 TO 36.9 IN ADULT (H): ICD-10-CM

## 2024-04-04 DIAGNOSIS — E78.5 HYPERLIPIDEMIA, UNSPECIFIED HYPERLIPIDEMIA TYPE: Primary | ICD-10-CM

## 2024-04-04 DIAGNOSIS — E66.01 CLASS 2 SEVERE OBESITY DUE TO EXCESS CALORIES WITH SERIOUS COMORBIDITY AND BODY MASS INDEX (BMI) OF 36.0 TO 36.9 IN ADULT (H): ICD-10-CM

## 2024-04-04 DIAGNOSIS — E55.9 VITAMIN D DEFICIENCY: ICD-10-CM

## 2024-04-04 DIAGNOSIS — R25.2 LEG CRAMPS: ICD-10-CM

## 2024-04-04 DIAGNOSIS — N18.30 STAGE 3 CHRONIC KIDNEY DISEASE, UNSPECIFIED WHETHER STAGE 3A OR 3B CKD (H): ICD-10-CM

## 2024-04-04 PROCEDURE — 99396 PREV VISIT EST AGE 40-64: CPT | Performed by: NURSE PRACTITIONER

## 2024-04-04 SDOH — HEALTH STABILITY: PHYSICAL HEALTH: ON AVERAGE, HOW MANY DAYS PER WEEK DO YOU ENGAGE IN MODERATE TO STRENUOUS EXERCISE (LIKE A BRISK WALK)?: 3 DAYS

## 2024-04-04 SDOH — HEALTH STABILITY: PHYSICAL HEALTH: ON AVERAGE, HOW MANY MINUTES DO YOU ENGAGE IN EXERCISE AT THIS LEVEL?: 20 MIN

## 2024-04-04 ASSESSMENT — SOCIAL DETERMINANTS OF HEALTH (SDOH): HOW OFTEN DO YOU GET TOGETHER WITH FRIENDS OR RELATIVES?: TWICE A WEEK

## 2024-04-04 ASSESSMENT — PAIN SCALES - GENERAL: PAINLEVEL: NO PAIN (0)

## 2024-04-04 NOTE — PROGRESS NOTES
Preventive Care Visit  [facility] JANELLE Perez NP,    Apr 4, 2024      Assessment & Plan   Problem List Items Addressed This Visit       HLD (hyperlipidemia) - Primary    Relevant Orders    Lipid panel reflex to direct LDL Non-fasting    Chronic kidney disease, stage 3 (H)    Relevant Orders    Albumin Random Urine Quantitative with Creat Ratio    Class 2 severe obesity due to excess calories with serious comorbidity in adult (H)     Other Visit Diagnoses       Annual physical exam        Vitamin D deficiency        Relevant Orders    Vitamin D deficiency screening    Leg cramps        Relevant Orders    CBC with platelets and differential    Comprehensive metabolic panel    UA Macroscopic with reflex to Microscopic and Culture - Lab Collect    TSH with free T4 reflex    Vitamin B12    Magnesium    Iron and iron binding capacity    Ferritin           Counseling  Appropriate preventive services were discussed with this patient, including applicable screening as appropriate for fall prevention, nutrition, physical activity, Tobacco-use cessation, weight loss and cognition.  Checklist reviewing preventive services available has been given to the patient.  Reviewed patient's diet, addressing concerns and/or questions.   She is at risk for lack of exercise and has been provided with information to increase physical activity for the benefit of her well-being.           Nadya Arredondo is a 63 year old, presenting for the following:  Physical        4/4/2024     1:01 PM   Additional Questions   Roomed by Alex JESSICA CMA        Via the Health Maintenance questionnaire, the patient has reported the following services have been completed -Cervical Cancer Screening, this information has been sent to the abstraction team.  Health Care Directive  Patient does not have a Health Care Directive or Living Will: Discussed advance care planning with patient; however, patient declined at this  time.    HPI        4/4/2024   General Health   How would you rate your overall physical health? Excellent   Feel stress (tense, anxious, or unable to sleep) Not at all         4/4/2024   Nutrition   Three or more servings of calcium each day? Yes   Diet: Regular (no restrictions)   How many servings of fruit and vegetables per day? (!) 2-3   How many sweetened beverages each day? 0-1         4/4/2024   Exercise   Days per week of moderate/strenous exercise 3 days   Average minutes spent exercising at this level 20 min         4/4/2024   Social Factors   Frequency of gathering with friends or relatives Twice a week   Worry food won't last until get money to buy more No   Food not last or not have enough money for food? No   Do you have housing?  Yes   Are you worried about losing your housing? No   Lack of transportation? No   Unable to get utilities (heat,electricity)? No         4/4/2024   Dental   Dentist two times every year? Yes         4/4/2024   TB Screening   Were you born outside of the [place]? No         Today's PHQ-2 Score:       4/4/2024    12:57 PM   PHQ-2 ( 1999 [employer])   Q1: Little interest or pleasure in doing things 0   Q2: Feeling down, depressed or hopeless 0   PHQ-2 Score 0   Q1: Little interest or pleasure in doing things Not at all   Q2: Feeling down, depressed or hopeless Not at all   PHQ-2 Score 0           4/4/2024   Substance Use   Alcohol more than 3/day or more than 7/wk No   Do you use any other substances recreationally? No     Social History     Tobacco Use    Smoking status: Never    Smokeless tobacco: Never   Vaping Use    Vaping Use: Never used   Substance Use Topics    Alcohol use: No    Drug use: No             4/4/2024   Breast Cancer Screening   Family history of breast, colon, or ovarian cancer? No / Unknown         10/12/2023   LAST FHS-7 RESULTS   1st degree relative breast or ovarian cancer No   Any relative bilateral breast cancer No   Any male have breast cancer No   Any ONE  woman have BOTH breast AND ovarian cancer No   Any woman with breast cancer before 50yrs No   2 or more relatives with breast AND/OR ovarian cancer No   2 or more relatives with breast AND/OR bowel cancer No        Mammogram Screening - Mammogram every 1-2 years updated in Health Maintenance based on mutual decision making        2024   STI Screening   New sexual partner(s) since last STI/HIV test? No     History of abnormal Pap smear: Last 3 Pap and HPV Results:         ASCVD Risk   The 10-year ASCVD risk score (Haydee MALDONADO, et al., 2019) is: 5.4%    Values used to calculate the score:      Age: 63 years      Sex: Female      Is Non- : No      Diabetic: No      Tobacco smoker: No      Systolic Blood Pressure: 136 mmHg      Is BP treated: No      HDL Cholesterol: 46 mg/dL      Total Cholesterol: 162 mg/dL           Reviewed and updated as needed this visit by Provider                    No past medical history on file.  Past Surgical History:   Procedure Laterality Date    MAMMOPLASTY AUGMENTATION Bilateral      OB History    Para Term  AB Living   3 3 3 0 0 0   SAB IAB Ectopic Multiple Live Births   0 0 0 0 0      # Outcome Date GA Lbr Sukhjinder/2nd Weight Sex Delivery Anes PTL Lv   3 Term            2 Term            1 Term              BP Readings from Last 3 Encounters:   24 136/82   24 138/86   10/27/23 130/85    Wt Readings from Last 3 Encounters:   24 94 kg (207 lb 4.8 oz)   24 81.6 kg (180 lb)   10/27/23 92.4 kg (203 lb 12.8 oz)                  Patient Active Problem List   Diagnosis    HLD (hyperlipidemia)    Menopause    Family history of Alzheimer's disease    Chronic kidney disease, stage 3 (H)    Class 2 severe obesity due to excess calories with serious comorbidity in adult (H)     Past Surgical History:   Procedure Laterality Date    MAMMOPLASTY AUGMENTATION Bilateral        Social History     Tobacco Use    Smoking status:  Never    Smokeless tobacco: Never   Substance Use Topics    Alcohol use: No     Family History   Problem Relation Age of Onset    Diabetes Mother     Heart Disease Mother     Hypertension Mother     Hyperlipidemia Mother     Osteoporosis Mother     Hyperlipidemia Father     Cerebrovascular Disease Maternal Grandmother     Alzheimer Disease Sister          Current Outpatient Medications   Medication Sig Dispense Refill    aspirin 81 MG EC tablet Take 81 mg by mouth 2 times daily       calcium-vitamin D 250-100 mg-unit per tablet [CALCIUM-VITAMIN D 250-100 MG-UNIT PER TABLET] Take 2 tablets by mouth 2 (two) times a day.      cholecalciferol, vitamin D3, 5,000 unit Tab Take 10,000 Units by mouth 2 times daily       coenzyme Q10 100 mg capsule [COENZYME Q10 100 MG CAPSULE] Take 300 mg by mouth daily.      MULTIVITS-MIN/IRON/FA/LUTEIN (CENTRUM SILVER WOMEN ORAL) [MULTIVITS-MIN/IRON/FA/LUTEIN (CENTRUM SILVER WOMEN ORAL)] Take by mouth.      Omega-3-acid Ethyl Esters (FISH OIL OMEGA-3 FATTY ACID) 320MG/ML oral suspension Take 1,200 mg by mouth daily      progesterone (PROMETRIUM) 100 MG capsule       progesterone (PROMETRIUM) 100 MG capsule [PROGESTERONE (PROMETRIUM) 100 MG CAPSULE] Take 100 mg by mouth 3 (three) times a day. 1 in the AM and 2 in the PM.      RESVERATROL ORAL [RESVERATROL ORAL] Take 2 tablets by mouth 2 (two) times a day.      rosuvastatin (CRESTOR) 40 MG tablet Take 1 tablet (40 mg) by mouth at bedtime 90 tablet 3    TESTOSTERONE IM Inject 0.2 mLs into the muscle       Allergies   Allergen Reactions    Ragweed Pollen [Ragweeds] Itching     Recent Labs   Lab Test 10/27/23  1131 11/22/22  1135 08/06/21  0927 03/10/21  1255 06/23/20  1107 06/23/20  1107 04/01/19  1214 04/01/19  1214   A1C 6.1* 6.0* 5.6  --   --  5.6   < >  --    LDL  --  65 62  --   --  65  --  64   HDL  --  46* 44*  --   --  45*  --  47*   TRIG  --  253* 152*  --   --  171*  --  117   ALT  --  31 32  --   --  24  --  33   CR 1.01* 0.95 1.03  " --   --  0.98  --  1.03   GFRESTIMATED 62 67 58*  --    < > 58*  --  55*   GFRESTBLACK  --   --   --   --   --  >60  --  >60   POTASSIUM 4.6 4.4  --   --   --  4.3  --  4.7   TSH  --   --   --  1.59  --  2.69  --   --     < > = values in this interval not displayed.          Review of Systems  Constitutional, HEENT, cardiovascular, pulmonary, GI, , musculoskeletal, neuro, skin, endocrine and psych systems are negative, except as otherwise noted.     Objective    Exam  /82 (BP Location: Right arm, Patient Position: Sitting, Cuff Size: Adult Regular)   Pulse 91   Resp 16   Ht 1.613 m (5' 3.5\")   Wt 94 kg (207 lb 4.8 oz)   LMP 07/26/2005 (Exact Date)   SpO2 97%   BMI 36.15 kg/m     Estimated body mass index is 36.15 kg/m  as calculated from the following:    Height as of this encounter: 1.613 m (5' 3.5\").    Weight as of this encounter: 94 kg (207 lb 4.8 oz).    Physical Exam  GENERAL: alert and no distress  EYES: Eyes grossly normal to inspection, PERRL and conjunctivae and sclerae normal  HENT: ear canals and TM's normal, nose and mouth without ulcers or lesions  NECK: no adenopathy, no asymmetry, masses, or scars  RESP: lungs clear to auscultation - no rales, rhonchi or wheezes  CV: regular rate and rhythm, normal S1 S2, no S3 or S4, no murmur, click or rub, no peripheral edema  ABDOMEN: soft, nontender, no hepatosplenomegaly, no masses and bowel sounds normal  MS: no gross musculoskeletal defects noted, no edema  PSYCH: mentation appears normal, affect normal/bright      The 10-year ASCVD risk score (Haydee DK, et al., 2019) is: 5.4%    Values used to calculate the score:      Age: 63 years      Sex: Female      Is Non- : No      Diabetic: No      Tobacco smoker: No      Systolic Blood Pressure: 136 mmHg      Is BP treated: No      HDL Cholesterol: 46 mg/dL      Total Cholesterol: 162 mg/dL    Signed Electronically by: Kristel Perez NP    "

## 2024-04-09 ENCOUNTER — LAB (OUTPATIENT)
Dept: LAB | Facility: CLINIC | Age: 64
End: 2024-04-09
Payer: COMMERCIAL

## 2024-04-09 DIAGNOSIS — R25.2 LEG CRAMPS: ICD-10-CM

## 2024-04-09 DIAGNOSIS — N18.30 CHRONIC KIDNEY DISEASE, STAGE 3 (H): Primary | ICD-10-CM

## 2024-04-09 DIAGNOSIS — N18.30 STAGE 3 CHRONIC KIDNEY DISEASE, UNSPECIFIED WHETHER STAGE 3A OR 3B CKD (H): ICD-10-CM

## 2024-04-09 DIAGNOSIS — E78.5 HYPERLIPIDEMIA, UNSPECIFIED HYPERLIPIDEMIA TYPE: ICD-10-CM

## 2024-04-09 DIAGNOSIS — E55.9 VITAMIN D DEFICIENCY: ICD-10-CM

## 2024-04-09 LAB
ALBUMIN SERPL BCG-MCNC: 4.5 G/DL (ref 3.5–5.2)
ALBUMIN UR-MCNC: NEGATIVE MG/DL
ALP SERPL-CCNC: 87 U/L (ref 40–150)
ALT SERPL W P-5'-P-CCNC: 29 U/L (ref 0–50)
ANION GAP SERPL CALCULATED.3IONS-SCNC: 10 MMOL/L (ref 7–15)
APPEARANCE UR: CLEAR
AST SERPL W P-5'-P-CCNC: 30 U/L (ref 0–45)
BACTERIA #/AREA URNS HPF: ABNORMAL /HPF
BASOPHILS # BLD AUTO: 0 10E3/UL (ref 0–0.2)
BASOPHILS NFR BLD AUTO: 0 %
BILIRUB SERPL-MCNC: 0.3 MG/DL
BILIRUB UR QL STRIP: NEGATIVE
BUN SERPL-MCNC: 11.2 MG/DL (ref 8–23)
CALCIUM SERPL-MCNC: 9.8 MG/DL (ref 8.8–10.2)
CHLORIDE SERPL-SCNC: 105 MMOL/L (ref 98–107)
CHOLEST SERPL-MCNC: 131 MG/DL
COLOR UR AUTO: YELLOW
CREAT SERPL-MCNC: 0.96 MG/DL (ref 0.51–0.95)
CREAT UR-MCNC: 267 MG/DL
DEPRECATED HCO3 PLAS-SCNC: 25 MMOL/L (ref 22–29)
EGFRCR SERPLBLD CKD-EPI 2021: 66 ML/MIN/1.73M2
EOSINOPHIL # BLD AUTO: 0.3 10E3/UL (ref 0–0.7)
EOSINOPHIL NFR BLD AUTO: 4 %
ERYTHROCYTE [DISTWIDTH] IN BLOOD BY AUTOMATED COUNT: 12.1 % (ref 10–15)
FASTING STATUS PATIENT QL REPORTED: YES
FERRITIN SERPL-MCNC: 52 NG/ML (ref 11–328)
GLUCOSE SERPL-MCNC: 131 MG/DL (ref 70–99)
GLUCOSE UR STRIP-MCNC: NEGATIVE MG/DL
HCT VFR BLD AUTO: 42.2 % (ref 35–47)
HDLC SERPL-MCNC: 46 MG/DL
HGB BLD-MCNC: 14 G/DL (ref 11.7–15.7)
HGB UR QL STRIP: ABNORMAL
HYALINE CASTS #/AREA URNS LPF: ABNORMAL /LPF
IMM GRANULOCYTES # BLD: 0 10E3/UL
IMM GRANULOCYTES NFR BLD: 0 %
IRON BINDING CAPACITY (ROCHE): 395 UG/DL (ref 240–430)
IRON SATN MFR SERPL: 19 % (ref 15–46)
IRON SERPL-MCNC: 75 UG/DL (ref 37–145)
KETONES UR STRIP-MCNC: NEGATIVE MG/DL
LDLC SERPL CALC-MCNC: 47 MG/DL
LEUKOCYTE ESTERASE UR QL STRIP: NEGATIVE
LYMPHOCYTES # BLD AUTO: 2.7 10E3/UL (ref 0.8–5.3)
LYMPHOCYTES NFR BLD AUTO: 38 %
MAGNESIUM SERPL-MCNC: 2.3 MG/DL (ref 1.7–2.3)
MCH RBC QN AUTO: 30.6 PG (ref 26.5–33)
MCHC RBC AUTO-ENTMCNC: 33.2 G/DL (ref 31.5–36.5)
MCV RBC AUTO: 92 FL (ref 78–100)
MICROALBUMIN UR-MCNC: 25.3 MG/L
MICROALBUMIN/CREAT UR: 9.48 MG/G CR (ref 0–25)
MONOCYTES # BLD AUTO: 0.5 10E3/UL (ref 0–1.3)
MONOCYTES NFR BLD AUTO: 7 %
MUCOUS THREADS #/AREA URNS LPF: PRESENT /LPF
NEUTROPHILS # BLD AUTO: 3.5 10E3/UL (ref 1.6–8.3)
NEUTROPHILS NFR BLD AUTO: 51 %
NITRATE UR QL: NEGATIVE
NONHDLC SERPL-MCNC: 85 MG/DL
PH UR STRIP: 6 [PH] (ref 5–8)
PLATELET # BLD AUTO: 334 10E3/UL (ref 150–450)
POTASSIUM SERPL-SCNC: 4.3 MMOL/L (ref 3.4–5.3)
PROT SERPL-MCNC: 7.2 G/DL (ref 6.4–8.3)
RBC # BLD AUTO: 4.57 10E6/UL (ref 3.8–5.2)
RBC #/AREA URNS AUTO: ABNORMAL /HPF
SODIUM SERPL-SCNC: 140 MMOL/L (ref 135–145)
SP GR UR STRIP: 1.02 (ref 1–1.03)
SQUAMOUS #/AREA URNS AUTO: ABNORMAL /LPF
TRIGL SERPL-MCNC: 189 MG/DL
TSH SERPL DL<=0.005 MIU/L-ACNC: 3.26 UIU/ML (ref 0.3–4.2)
UROBILINOGEN UR STRIP-ACNC: 0.2 E.U./DL
VIT B12 SERPL-MCNC: 789 PG/ML (ref 232–1245)
VIT D+METAB SERPL-MCNC: 81 NG/ML (ref 20–50)
WBC # BLD AUTO: 7 10E3/UL (ref 4–11)
WBC #/AREA URNS AUTO: ABNORMAL /HPF

## 2024-04-09 PROCEDURE — 82043 UR ALBUMIN QUANTITATIVE: CPT

## 2024-04-09 PROCEDURE — 80053 COMPREHEN METABOLIC PANEL: CPT

## 2024-04-09 PROCEDURE — 82306 VITAMIN D 25 HYDROXY: CPT

## 2024-04-09 PROCEDURE — 80061 LIPID PANEL: CPT

## 2024-04-09 PROCEDURE — 82607 VITAMIN B-12: CPT

## 2024-04-09 PROCEDURE — 36415 COLL VENOUS BLD VENIPUNCTURE: CPT

## 2024-04-09 PROCEDURE — 81001 URINALYSIS AUTO W/SCOPE: CPT

## 2024-04-09 PROCEDURE — 82728 ASSAY OF FERRITIN: CPT

## 2024-04-09 PROCEDURE — 82570 ASSAY OF URINE CREATININE: CPT

## 2024-04-09 PROCEDURE — 83735 ASSAY OF MAGNESIUM: CPT

## 2024-04-09 PROCEDURE — 84443 ASSAY THYROID STIM HORMONE: CPT

## 2024-04-09 PROCEDURE — 83550 IRON BINDING TEST: CPT

## 2024-04-09 PROCEDURE — 85025 COMPLETE CBC W/AUTO DIFF WBC: CPT

## 2024-04-09 PROCEDURE — 83540 ASSAY OF IRON: CPT

## 2024-08-28 ENCOUNTER — OFFICE VISIT (OUTPATIENT)
Dept: FAMILY MEDICINE | Facility: CLINIC | Age: 64
End: 2024-08-28
Payer: COMMERCIAL

## 2024-08-28 VITALS
RESPIRATION RATE: 16 BRPM | SYSTOLIC BLOOD PRESSURE: 126 MMHG | DIASTOLIC BLOOD PRESSURE: 81 MMHG | HEART RATE: 75 BPM | WEIGHT: 206 LBS | BODY MASS INDEX: 36.5 KG/M2 | OXYGEN SATURATION: 97 % | TEMPERATURE: 98 F | HEIGHT: 63 IN

## 2024-08-28 DIAGNOSIS — R73.03 PREDIABETES: ICD-10-CM

## 2024-08-28 DIAGNOSIS — K76.0 NAFLD (NONALCOHOLIC FATTY LIVER DISEASE): ICD-10-CM

## 2024-08-28 DIAGNOSIS — N18.31 STAGE 3A CHRONIC KIDNEY DISEASE (H): ICD-10-CM

## 2024-08-28 DIAGNOSIS — E78.5 DYSLIPIDEMIA: ICD-10-CM

## 2024-08-28 DIAGNOSIS — E66.812 CLASS 2 SEVERE OBESITY DUE TO EXCESS CALORIES WITH SERIOUS COMORBIDITY AND BODY MASS INDEX (BMI) OF 36.0 TO 36.9 IN ADULT (H): Primary | ICD-10-CM

## 2024-08-28 DIAGNOSIS — E66.01 CLASS 2 SEVERE OBESITY DUE TO EXCESS CALORIES WITH SERIOUS COMORBIDITY AND BODY MASS INDEX (BMI) OF 36.0 TO 36.9 IN ADULT (H): Primary | ICD-10-CM

## 2024-08-28 DIAGNOSIS — E88.810 METABOLIC SYNDROME X: ICD-10-CM

## 2024-08-28 PROCEDURE — 99417 PROLNG OP E/M EACH 15 MIN: CPT | Performed by: FAMILY MEDICINE

## 2024-08-28 PROCEDURE — 99215 OFFICE O/P EST HI 40 MIN: CPT | Performed by: FAMILY MEDICINE

## 2024-08-28 PROCEDURE — G2211 COMPLEX E/M VISIT ADD ON: HCPCS | Performed by: FAMILY MEDICINE

## 2024-08-28 ASSESSMENT — SLEEP AND FATIGUE QUESTIONNAIRES
HOW LIKELY ARE YOU TO NOD OFF OR FALL ASLEEP WHILE LYING DOWN TO REST IN THE AFTERNOON WHEN CIRCUMSTANCES PERMIT: HIGH CHANCE OF DOZING
HOW LIKELY ARE YOU TO NOD OFF OR FALL ASLEEP IN A CAR, WHILE STOPPED FOR A FEW MINUTES IN TRAFFIC: WOULD NEVER DOZE
HOW LIKELY ARE YOU TO NOD OFF OR FALL ASLEEP WHILE SITTING AND TALKING TO SOMEONE: SLIGHT CHANCE OF DOZING
HOW LIKELY ARE YOU TO NOD OFF OR FALL ASLEEP WHILE WATCHING TV: MODERATE CHANCE OF DOZING
HOW LIKELY ARE YOU TO NOD OFF OR FALL ASLEEP WHILE SITTING AND READING: MODERATE CHANCE OF DOZING
HOW LIKELY ARE YOU TO NOD OFF OR FALL ASLEEP WHILE SITTING QUIETLY AFTER LUNCH WITHOUT ALCOHOL: SLIGHT CHANCE OF DOZING
HOW LIKELY ARE YOU TO NOD OFF OR FALL ASLEEP WHEN YOU ARE A PASSENGER IN A CAR FOR AN HOUR WITHOUT A BREAK: MODERATE CHANCE OF DOZING
HOW LIKELY ARE YOU TO NOD OFF OR FALL ASLEEP WHILE SITTING INACTIVE IN A PUBLIC PLACE: SLIGHT CHANCE OF DOZING

## 2024-08-28 NOTE — PATIENT INSTRUCTIONS
"100+ grams of protein per day  800 grams of veggies/fruit day   Do not drink your calories    Non-injectable options for weight loss:  primarily have the effect of reducing caloric intake.    Binge eating disorder: Vyvanse (AKA lisdexamfetamine).  This medication reduces impulsivity leading to binge eating behaviors.    Excess appetite: Phentermine, phentermine/topiramate, diethylpropion.  Historically these medications are the work horse of medical weight loss.  High-dose phentermine has a high risk of weight regain.  Phentermine can cause irritability, dry mouth.  These medications are pregnancy category X and we generally require patients to actively avoid pregnancy.  Principal benefit of phentermine or phentermine/topiramate is cost and ease of entry.    Reward pathway: Contrave (bupropion/naltrexone).  Both medicines work to blunt cravings.  They work by making it easier to adhere to overall lifestyle factors.      GLP-1 receptor agonist drug class side effects:    Bureaucracy: Cost, possibility of weight regain?    Not generally covered by Medicare (exception for some plans are for individuals with histories of heart disease).  The typical patient will likely regain weight after going off the medicine.  (Most patients who have gone off of the medicine have not gone off the medicine on his/her \"own terms\").  Think of these medicines in the 5 or 10-year time increments.    Delayed gastric emptying:  These are the side effects that most commonly result in individuals going off of these therapies.  Slowing of the digestive tract.  Food will stay in the stomach longer.  Most common side effect is nausea and queasiness which tends to improve.    Some individuals experience random throwing up.  In my opinion, the medications not worth it if this is occurring.  Constipation.      Effect on mood:  I have had a couple of patients experience dramatic changes in level of anxiety and depression.  There are case reports in " the literature.  Some individuals use food as a coping mechanism.  If this coping mechanism is no longer possible given the effect of the medicine, some individuals struggle.    Physical Fragility:  Weight loss due to loss of muscle vs burning of fat.  I am concerned that individuals who take these medications long-term are at risk for excessive loss of muscle (medical term: sarcopenia).  Low muscle mass in older individuals leads to injury and decreased overall function.   Strategies to mitigate loss of muscle include dietary practices focused on protein and focusing exercise activities on strength training with functional fitness goals.     Contraindications:  Personal or family history of medullary thyroid cancer, multiple endocrine neoplasm syndrome.  There are some reports that lifetime risk of thyroid cancer increases 2-3x.  This is a rare cancer to begin with.  Personal history of pancreatitis.    GLP1-ra options:    Wegovy (or Ozempic) aka semaglutide: cost per month retail ~$1400      Zepbound (or Mounjaro) aka tirzepatide: cost per month retail ~$1100 (there is an online coupon)      Saxenda (or Victoza) aka liraglutide: cost per month retail: ~$1400    Compounded options for GLPs:    How long will the therapy to be the an option?      - https://www.npr.org/sections/shots-health-news/2024/07/24/eu-q9-5343493/cheaper-semaglutide-wegovy-ozempic-zepbound-copies-could-disappear  (July 2024)    Semaglutide: 1 month multi-dose vial  - 1mL (dose 1mg or less), $230  - 2mL (dose over 1mg) , $370     Tirzepatide: 1 month, 4 pre-filled syringes  - 2.5mg, $400  - 5mg, $450  - 7.5mg, $500  - 10mg, $533  - 12.5mg, $550 (it becomes cheaper to use manufacture's coupon at [facility] and get the name brand medication)   - 15mg, $600 (it becomes cheaper to use manufacture's coupon at [facility] and get the name brand medication)

## 2024-08-28 NOTE — PROGRESS NOTES
"    New Medical Weight Management Consult    PATIENT:  Maria Elena Abreu  MRN:         [id number]  :         1960  RAQUEL:         2024    Dear Kristel Perez NP,    I had the pleasure of seeing your patient, Maria Elena Abreu. Full intake/assessment was done to determine barriers to weight loss success and develop a treatment plan. Maria Elena Abreu is a 64 year old female interested in treatment of medical problems associated with excess weight. She has a height of 5' 3.25\", a weight of 206 lbs 0 oz, and the calculated Body mass index is 36.2 kg/m .    ASSESSMENT/PLAN:  Stage 3a chronic kidney disease (H)  CKD measurements have been better over the past year.  She did have 3 consecutive measurements that were consistent with chronic kidney disease-stage IIIa.  She may be eligible for a SGLT2 inhibitor which would have weight loss properties and work against metabolic syndrome.    Class 2 severe obesity due to excess calories with serious comorbidity and body mass index (BMI) of 36.0 to 36.9 in adult (H)  64 year old year old female in clinic today to discuss treatment of the following conditions through diet and lifestyle modification and weight loss:  1. Class 2 severe obesity due to excess calories with serious comorbidity and body mass index (BMI) of 36.0 to 36.9 in adult (H)    2. NAFLD (nonalcoholic fatty liver disease)    3. Prediabetes    4. Dyslipidemia    5. Metabolic syndrome X    6. Stage 3a chronic kidney disease (H)      INTAKE: 64-year-old woman with history of metabolic syndrome, weight gain and evidence of NAFLD (hepatic steatosis on imaging) presenting for discussion of metabolic health and weight.  In general, she tries to execute a calorically restricted diet.  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  Unlikely to be experiencing any iatrogenic weight gain.  We had a lengthy discussion about nutrition and physical " "activity.  She has been on high-dose statin therapy for a number of years and an aspirin although she does not have a diagnosis of coronary artery disease.  She had a significant calcification of the left anterior descending artery on a CT coronary calcium scan.  She has a strong family history of heart disease.  - There is opportunity to improve nutrition and optimize to protein.  I suspect that if she increases her protein, shows less symptoms of fluctuation of blood sugar and therefore will be able to eat in a more consistent way thereby decreasing overall caloric intake.  - To help facilitate behavioral change she was referred for the 24-week comprehensive weight management program.  She is an employee and so her cost is likely $99.  - She is a candidate for GLP-1 receptor agonist.  She has no personal or family history of medullary thyroid cancer.  No personal history of pancreatitis.  She meets criteria through her employer given her BMI greater than 30 kg/m  plus the comorbidity of fatty liver disease.  [facility] pharmacy referral placed.  The patient is undecided and may opt to schedule in several months as a \"Plan B\" if she is not realizing improvement.  - We discussed timeline of using a GLP-1 receptor agonist for medical weight loss.  She is planning on working another 2 or 3 years in her current role for [employer].  From there she will transition to Medicare.  I think there is a decent argument that she does have a history of heart disease given her CT coronary calcium scan score, family history and the number of years that she has been on high-dose statin therapy.  She has been managed as though she has secondary prevention framework.  A GLP-1 receptor agonist which is Wegovy would probably be covered under this scenario (uncertainty was discussed with patient).  - If she does start a GLP-1 receptor agonist I like to see her back in clinic approximately 2-3 months after starting that medicine.  -Otherwise we " "would like to see her back in 6 months to review progress.    FOLLOW-UP:   6 months.    SUBJECTIVE:     She has the following co-morbidities:        8/28/2024    12:18 PM   --   I have the following health issues associated with obesity Pre-Diabetes    High Cholesterol    Fatty Liver   I have the following symptoms associated with obesity Hip Pain     Narrative History:    - referred by a friend.     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  She is frustrated that her physical activity has not resulted in weight loss.   - busy with grandchildren.     - she feel sick if I don't eat   - has not been successful with JORGE.         8/28/2024    12:18 PM   Referring Provider   Please name the provider who referred you to Medical Weight Management  If you do not know, please answer \"I Don't Know\" i don't know         8/28/2024    12:18 PM   Weight History   How concerned are you about your weight? Very Concerned   I became overweight As an Adult   The following factors have contributed to my weight gain Eating Wrong Types of Food    Eating Too Much    Lack of Exercise    Genetic (Runs in the Family)   My lowest weight since age 17 was 108   My highest weight since age 17 was 207   The most weight I have ever lost was (lbs) 40   I have the following family history of obesity/being overweight My mother is overweight    My father is overweight    One or more of my siblings are overweight   How has your weight changed over the last year? Gained         8/28/2024    12:18 PM   Diet Recall Review with Patient   If you do eat breakfast, what types of food do you eat? fruits, toast, eggs,   If you do eat lunch, what types of food do you typically eat? protein bar or shake, sandwhich,fruit   If you do eat supper, what types of food do you typically eat? protein, fish, chicken, meat, salad, veggies   If you do snack, what types of food do you typically eat? popcorn, apple, banana   How many glasses of juice do you drink in a " typical day? Rare.    How many of glasses of milk do you drink in a typical day? 1   If you do drink milk, what type? Skim   How many 8oz glasses of sugar containing drinks such as Joo-Aid/sweet tea do you drink in a day? 0   How many cans/bottles of sugar pop/soda/tea/sports drinks do you drink in a day? 0   How many cans/bottles of diet pop/soda/tea or sports drink do you drink in a day? 1-2 cans - diet doctor pepper.    How often do you have a drink of alcohol? Never         8/28/2024    12:18 PM   Eating Habits   Generally, my meals include foods like these bread, pasta, rice, potatoes, corn, crackers, sweet dessert, pop, or juice Almost Everyday   Generally, my meals include foods like these fried meats, brats, burgers, french fries, pizza, cheese, chips, or ice cream A Few Times a Week   Eat fast food (like [employer], [employer], Taco Bell) Once a Week   Eat at a buffet or sit-down restaurant Once a Week   Eat most of my meals in front of the TV or computer Almost Everyday   Often skip meals, eat at random times, have no regular eating times Less Than Weekly   Rarely sit down for a meal but snack or graze throughout Less Than Weekly   Eat extra snacks between meals A Few Times a Week   Eat most of my food at the end of the day Less Than Weekly   Eat in the middle of the night or wake up at night to eat Never   Eat extra snacks to prevent or correct low blood sugar Never   Eat to prevent acid reflux or stomach pain Never   Worry about not having enough food to eat Never   I eat when I am depressed Less Than Weekly   I eat when I am stressed Once a Week   I eat when I am bored Once a Week   I eat when I am anxious Once a Week   I eat when I am happy or as a reward Less Than Weekly   I feel hungry all the time even if I just have eaten Once a Week   Feeling full is important to me A Few Times a Week   I finish all the food on my plate even if I am already full Once a Week   I can't resist eating delicious food  or walk past the good food/smell Once a Week   I eat/snack without noticing that I am eating Less Than Weekly   I eat when I am preparing the meal Once a Week   I eat more than usual when I see others eating Once a Week   I have trouble not eating sweets, ice cream, cookies, or chips if they are around the house A Few Times a Week   I think about food all day Less Than Weekly   What foods, if any, do you crave? Sweets/Candy/Chocolate         8/28/2024    12:18 PM   Amount of Food   I feel out of control when eating Monthly   I eat a large amount of food, like a loaf of bread, a box of cookies, a pint/quart of ice cream, all at once Never   I eat a large amount of food even when I am not hungry Never   I eat rapidly Monthly   I eat alone because I feel embarrassed and do not want others to see how much I have eaten Never   I eat until I am uncomfortably full Almost Everyday   I feel bad, disgusted, or guilty after I overeat Weekly         8/28/2024    12:18 PM   Activity/Exercise History   How much of a typical 12 hour day do you spend sitting? Less Than Half the Day   How much of a typical 12 hour day do you spend lying down? Less Than Half the Day   How much of a typical day do you spend walking/standing? Most of the Day   How many hours (not including work) do you spend on the TV/Video Games/Computer/Tablet/Phone? 2-3 Hours   How many times a week are you active for the purpose of exercise? Once a Week   What keeps you from being more active? Lack of Time   How many total minutes do you spend doing some activity for the purpose of exercising when you exercise? 15-30 Minutes     PAST MEDICAL HISTORY:  History reviewed. No pertinent past medical history.      8/28/2024    12:18 PM   Work/Social History Reviewed With Patient   My employment status is Part-Time   My job is medical asst/nursing asst   How much of your job is spent on the computer or phone? Less Than 50%   How many hours do you spend commuting to work  daily? 0   What is your marital status? /In a Relationship   If in a relationship, is your significant other overweight? Yes   If you have children, are they overweight? Yes   Who do you live with?    Who does the food shopping? both of us         8/28/2024    12:18 PM   Mental Health History Reviewed With Patient   Have you ever been physically or sexually abused? No   How often in the past 2 weeks have you felt little interest or pleasure in doing things? Not at all   Over the past 2 weeks how often have you felt down, depressed, or hopeless? Not at all         8/28/2024    12:18 PM   Sleep History Reviewed With Patient   How many hours do you sleep at night? 8     MEDICATIONS:   Current Outpatient Medications   Medication Sig Dispense Refill    aspirin 81 MG EC tablet Take 81 mg by mouth 2 times daily       calcium-vitamin D 250-100 mg-unit per tablet [CALCIUM-VITAMIN D 250-100 MG-UNIT PER TABLET] Take 2 tablets by mouth 2 (two) times a day.      cholecalciferol, vitamin D3, 5,000 unit Tab Take 10,000 Units by mouth 2 times daily       coenzyme Q10 100 mg capsule [COENZYME Q10 100 MG CAPSULE] Take 300 mg by mouth daily.      MULTIVITS-MIN/IRON/FA/LUTEIN (CENTRUM SILVER WOMEN ORAL) [MULTIVITS-MIN/IRON/FA/LUTEIN (CENTRUM SILVER WOMEN ORAL)] Take by mouth.      Omega-3-acid Ethyl Esters (FISH OIL OMEGA-3 FATTY ACID) 320MG/ML oral suspension Take 1,200 mg by mouth daily      progesterone (PROMETRIUM) 100 MG capsule [PROGESTERONE (PROMETRIUM) 100 MG CAPSULE] Take 100 mg by mouth 3 (three) times a day. 1 in the AM and 2 in the PM.      RESVERATROL ORAL [RESVERATROL ORAL] Take 2 tablets by mouth 2 (two) times a day.      rosuvastatin (CRESTOR) 40 MG tablet Take 1 tablet (40 mg) by mouth at bedtime 90 tablet 3     ALLERGIES:   Allergies   Allergen Reactions    Ragweed Pollen [Ragweeds] Itching     PHYSICAL EXAM:  Physical Exam  Nursing note reviewed.   Constitutional:       General: She is not in acute  distress.     Appearance: Normal appearance. She is not ill-appearing.   HENT:      Head: Normocephalic and atraumatic.   Eyes:      Extraocular Movements: Extraocular movements intact.      Conjunctiva/sclera: Conjunctivae normal.   Pulmonary:      Effort: Pulmonary effort is normal.   Neurological:      Mental Status: She is alert and oriented to person, place, and time.   Psychiatric:         Attention and Perception: Attention normal.         Mood and Affect: Mood normal.         Speech: Speech normal.         Thought Content: Thought content normal.       70 minutes spent on the date of the encounter doing chart review, history and exam, documentation and further activities per the note    This note has been dictated using voice recognition software. Any grammatical or context distortions are unintentional and inherent to the software    Sincerely,    Mejia Martinez MD  Diplomate - American Board of Obesity Medicine  Diplomate - American Board of Family Medicine  [facility] - Comprehensive Weight Management

## 2024-08-28 NOTE — ASSESSMENT & PLAN NOTE
CKD measurements have been better over the past year.  She did have 3 consecutive measurements that were consistent with chronic kidney disease-stage IIIa.  She may be eligible for a SGLT2 inhibitor which would have weight loss properties and work against metabolic syndrome.

## 2024-08-28 NOTE — ASSESSMENT & PLAN NOTE
64 year old year old female in clinic today to discuss treatment of the following conditions through diet and lifestyle modification and weight loss:  1. Class 2 severe obesity due to excess calories with serious comorbidity and body mass index (BMI) of 36.0 to 36.9 in adult (H)    2. NAFLD (nonalcoholic fatty liver disease)    3. Prediabetes    4. Dyslipidemia    5. Metabolic syndrome X    6. Stage 3a chronic kidney disease (H)      INTAKE: 64-year-old woman with history of metabolic syndrome, weight gain and evidence of NAFLD (hepatic steatosis on imaging) presenting for discussion of metabolic health and weight.  In general, she tries to execute a calorically restricted diet.  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  Unlikely to be experiencing any iatrogenic weight gain.  We had a lengthy discussion about nutrition and physical activity.  She has been on high-dose statin therapy for a number of years and an aspirin although she does not have a diagnosis of coronary artery disease.  She had a significant calcification of the left anterior descending artery on a CT coronary calcium scan.  She has a strong family history of heart disease.  - There is opportunity to improve nutrition and optimize to protein.  I suspect that if she increases her protein, shows less symptoms of fluctuation of blood sugar and therefore will be able to eat in a more consistent way thereby decreasing overall caloric intake.  - To help facilitate behavioral change she was referred for the 24-week comprehensive weight management program.  She is an employee and so her cost is likely $99.  - She is a candidate for GLP-1 receptor agonist.  She has no personal or family history of medullary thyroid cancer.  No personal history of pancreatitis.  She meets criteria through her employer given her BMI greater than 30 kg/m  plus the comorbidity of fatty liver disease.   "[facility] pharmacy referral placed.  The patient is undecided and may opt to schedule in several months as a \"Plan B\" if she is not realizing improvement.  - We discussed timeline of using a GLP-1 receptor agonist for medical weight loss.  She is planning on working another 2 or 3 years in her current role for [employer].  From there she will transition to Medicare.  I think there is a decent argument that she does have a history of heart disease given her CT coronary calcium scan score, family history and the number of years that she has been on high-dose statin therapy.  She has been managed as though she has secondary prevention framework.  A GLP-1 receptor agonist which is Wegovy would probably be covered under this scenario (uncertainty was discussed with patient).  - If she does start a GLP-1 receptor agonist I like to see her back in clinic approximately 2-3 months after starting that medicine.  -Otherwise we would like to see her back in 6 months to review progress.  "

## 2024-09-03 ENCOUNTER — TELEPHONE (OUTPATIENT)
Dept: FAMILY MEDICINE | Facility: CLINIC | Age: 64
End: 2024-09-03
Payer: COMMERCIAL

## 2024-09-03 NOTE — TELEPHONE ENCOUNTER
MTM referral from: [facility] visit (referral by provider)    MTM referral outreach attempt #2 on September 3, 2024 at 10:26 AM      Outcome: Patient not reachable after several attempts, routed to Pharmacist Team/Provider as an FYI    Use HBC for the carrier/Plan on the flowsheet      [employer] Message Sent    LISBETH Cai

## 2024-09-05 ENCOUNTER — PATIENT OUTREACH (OUTPATIENT)
Dept: CARE COORDINATION | Facility: CLINIC | Age: 64
End: 2024-09-05
Payer: COMMERCIAL

## 2024-09-06 NOTE — TELEPHONE ENCOUNTER
MTM referral has been completed. She is scheduled to see Anabella Piña RPH on 09/23/2024 at 1:00 P.M.

## 2024-09-23 ENCOUNTER — VIRTUAL VISIT (OUTPATIENT)
Dept: CARDIOLOGY | Facility: CLINIC | Age: 64
End: 2024-09-23
Attending: FAMILY MEDICINE
Payer: COMMERCIAL

## 2024-09-23 VITALS — HEIGHT: 64 IN | WEIGHT: 190 LBS | BODY MASS INDEX: 32.44 KG/M2

## 2024-09-23 DIAGNOSIS — K21.9 GASTROESOPHAGEAL REFLUX DISEASE, UNSPECIFIED WHETHER ESOPHAGITIS PRESENT: ICD-10-CM

## 2024-09-23 DIAGNOSIS — E66.812 CLASS 2 SEVERE OBESITY DUE TO EXCESS CALORIES WITH SERIOUS COMORBIDITY AND BODY MASS INDEX (BMI) OF 36.0 TO 36.9 IN ADULT (H): Primary | ICD-10-CM

## 2024-09-23 DIAGNOSIS — E66.01 CLASS 2 SEVERE OBESITY DUE TO EXCESS CALORIES WITH SERIOUS COMORBIDITY AND BODY MASS INDEX (BMI) OF 36.0 TO 36.9 IN ADULT (H): Primary | ICD-10-CM

## 2024-09-23 DIAGNOSIS — Z78.9 TAKES DIETARY SUPPLEMENTS: ICD-10-CM

## 2024-09-23 DIAGNOSIS — E78.5 DYSLIPIDEMIA: ICD-10-CM

## 2024-09-23 RX ORDER — UREA 10 %
45 LOTION (ML) TOPICAL EVERY OTHER DAY
COMMUNITY

## 2024-09-23 ASSESSMENT — PAIN SCALES - GENERAL: PAINLEVEL: MODERATE PAIN (5)

## 2024-09-23 NOTE — PATIENT INSTRUCTIONS
Recommendations from today's MTM visit:                                                    MTM (medication therapy management) is a service provided by a clinical pharmacist designed to help you get the most of out of your medicines.      Pharmacist to start Prior Authorization on Wegovy. Once approved, to start Wegovy 0.25 mg subcutaneous once weekly for 4 weeks, then if tolerating increase to 0.5 mg weekly thereafter.  RX will be sent to [facility] for both 0.25 mg and 0.5 mg doses. The 0.25 mg dose will be filled. The 0.5 mg dose will be put on hold. Call the pharmacy when ready to fill the 0.5 mg box.   Educated on mechanism, adverse effects, monitoring, safety, administration with use of Wegovy.   Decrease aspirin 81 mg to 1 tablet daily.   Change omeprazole to famotidine 20 mg as needed. You can get this over the counter.   Schedule follow-up with Dr. Gamboa.       To help with tolerability and effectiveness of Wegovy:  Eat small meals/snacks throughout the day (about every 2-4 hours)  Focus on getting protein in first with each meal and snack.   A good starting goal is 60 g protein daily (track this, especially if at weight loss plateau). Once you consistently are getting 60g daily, try getting 90 g protein daily.  Drink plenty of water - goal 64 oz throughout the day  You may try Metamucil, Benefiber, or Citrucel to help feel more full (less nausea) and have softer, more consistent bowel movements.  To optimize weight management - work on incorporating resistance training/weight lifting to build muscle and improve overall metabolism of adipose tissue.      Wegovy Dosing:   Start Wegovy: It is a subcutaneous injection that you inject once weekly and titrate the dose slowly over time. Make sure inject the same time each day of the week, for example Monday evenings.  Each pen is single use.  In each prescription, you will get 4 pens in each box.  You will need a new prescription  for each strength of the medication.  Week 1-4: Inject 0.25 mg once weekly  Week 5-8: If tolerating, increase to 0.5 mg once weekly  Week 9-12: If tolerating, increase to 1 mg once weekly  Week 13-15: If tolerating, increase to 1.7 mg once weekly  Week 16 & on: If tolerating, increase to 2.4 mg once weekly  *If you are having some nausea or other side effects to where you are hesitant to move up to the next dose, stay at the same dose you are on for an additional week to see if side effect(s) improves/resolves. Make sure to take this time to hydrate and ensure you are drinking at least 64 oz water per day.  If you are wanting to stay at the same dose and do not have additional refills on that prescription please reach out to the clinic.    The goal is to get to a dose that is well tolerated and effective for you. You do not have to go up on the dose each month or get to maximum dose if getting an effective response with minimal side effects.     Wegovy Administration Video: Video that was created by the .  https://www.Sidense.com/watch?v=UJ2n2Rg1cE9    Wegovy Storage and Stability:   Make sure that when you get the prescription that you store the prescription in the refrigerator until it is time to use the Wegovy pen.  Once it is time to use the Wegovy pen, you can keep the pen at room temperature and it is good for up to 28 days at room temperature.     Wegovy Common Side Effects:   Nausea, diarrhea, constipation, headache, tiredness (fatigue), dizziness, stomach upset/pain. Less commonly, Wegovy can cause low blood sugar (symptoms: shaky, dizzy, sweaty, agitation). Please reach out to the care team should you feel like this is occurring. It is important to ensure that you are eating consistent meals and not skipping meals. Ensure you are getting at least 64 oz water daily.     If cost is prohibitive, you may try using Wegovy coupon (https://www.wegovy.com/coverage-and-savings)        Bharti puente  "with [employer] insurance ([facility]) are restricted to using the [facility] for Saxenda, Wegovy, and Zepbound    [facility]  [place], [place] - [address]  Phone: [phone]    Next steps:  After processing the prescription and saving to your profile, the pharmacy will give you an automated call to inform you that they have your prescription on file. This typically occurs within 1-2 days after the prescription is sent but may take 3-5 business days during high volume times.  You will need to call the pharmacy back to set up the first delivery of every new prescription or new medication dose.   Once you are on a stable dose, you can inquire with the pharmacy about signing up for \"Text to Order through [employer]\", \"Auto Fill\", and/or using the \"My [employer] Rx\" tyrone.       Follow-up: 11/19/2024 at 1:00pm with Anabella Piña PharmD         It was great speaking with you today.  I value your experience and would be very thankful for your time in providing feedback in our clinic survey. In the next few days, you may receive an email or text message from [facility] [employer] with a link to a survey related to your  clinical pharmacist.\"     To schedule another MTM appointment, please call the clinic directly or you may call the MTM scheduling line at [phone] or toll-free at [phone].     My Clinical Pharmacist's contact information:                                                      Please feel free to contact me with any questions or concerns you have.      Anabella Piña PharmD, [facility]  Medication Therapy Management (MTM) Pharmacist   [facility]     "

## 2024-09-23 NOTE — Clinical Note
Fina Leija. Established with [facility]. Per [employer] insurance requirements - used our CPA this week, started Wegovy.

## 2024-09-23 NOTE — Clinical Note
Hi Maria Elena Cooper agreed to start Wegovy and we got the prior authorization and prescription all set.  Will follow-up with her on the medication in November and will also remind her to schedule a follow-up with you.   Let me know if you have any questions or concerns,  Anabella Piña, PharmD, BCACP Medication Therapy Management (MTM) Pharmacist  [facility]

## 2024-09-23 NOTE — PROGRESS NOTES
Medication Therapy Management (MTM) Encounter    ASSESSMENT:                            Medication Adherence/Access: No issues identified    Weight management:   Patient would benefit from additional pharmacotherapy for weight management. Given class III obesity, recommend GLP1/GIP therapy as data supports most significant weight loss. Patient also likely to benefit from reduction in food noise and increased satiety. Negative GI symptomatology at baseline. Negative history of pancreatitis, medullary thyroid cancer and multiple endocrine neoplasia type 2.      For patients that are under [employer] insurance coverage, it is mandated by insurance that each qualifying patient meet with hospital based Weight Management Medication Therapy Management pharmacist to continue therapy coverage. The following patient meets the below coverage criteria and can therefore continue GLP-1/GIP agonist therapy for Weight Management:    Adult  BMI >40 with or without comorbidities   OR   BMI >30 + NAFLD*   at time of initiating GLP-1/GIP agonist therapy Approved for 29 weeks  Met Updated Initial Criteria   At least 5% weight loss of baseline body weight  Approved for 12 months      Supplements   Stable.     Hyperlipidemia   High intensity statin appropriate with previous lipid values. Use of aspirin in doses greater than 81 mg daily has not shown increased benefit in primary prevention and increases risk of bleed.     GERD    While current therapy appears to be controlling GERD symptoms, long-term use of PPIs in the elderly is not recommended beyond 8 weeks' duration. This is due to increased risk of pneumonias as well as hypomagenemia and hypocalcemia (which can lead to fractures in the elderly patient at risk for falls). Patient's symptoms may be controlled with step-down therapy of cost-effective generic H2RA, famotidine. Since patient's CrCl is >60 mL/min, dose of 20 mg once daily is appropriate to prevent  rebound reflux.    Hormone replacement  Stable.     PLAN:                            Pharmacist to start Prior Authorization on Wegovy. Once approved, to start Wegovy 0.25 mg subcutaneous once weekly for 4 weeks, then if tolerating increase to 0.5 mg weekly thereafter.  RX will be sent to [facility] for both 0.25 mg and 0.5 mg doses. The 0.25 mg dose will be filled. The 0.5 mg dose will be put on hold. Call the pharmacy when ready to fill the 0.5 mg box.   Educated on mechanism, adverse effects, monitoring, safety, administration with use of Wegovy.   Decrease aspirin 81 mg to 1 tablet daily.   Change omeprazole to famotidine 20 mg as needed. You can get this over the counter.   Schedule follow-up with Dr. Gamboa.       To help with tolerability and effectiveness of Wegovy:  Eat small meals/snacks throughout the day (about every 2-4 hours)  Focus on getting protein in first with each meal and snack.   A good starting goal is 60 g protein daily (track this, especially if at weight loss plateau). Once you consistently are getting 60g daily, try getting 90 g protein daily.  Drink plenty of water - goal 64 oz throughout the day  You may try Metamucil, Benefiber, or Citrucel to help feel more full (less nausea) and have softer, more consistent bowel movements.  To optimize weight management - work on incorporating resistance training/weight lifting to build muscle and improve overall metabolism of adipose tissue.      Wegovy Dosing:   Start Wegovy: It is a subcutaneous injection that you inject once weekly and titrate the dose slowly over time. Make sure inject the same time each day of the week, for example Monday evenings.  Each pen is single use.  In each prescription, you will get 4 pens in each box.  You will need a new prescription for each strength of the medication.  Week 1-4: Inject 0.25 mg once weekly  Week 5-8: If tolerating, increase to 0.5 mg once weekly  Week 9-12: If  tolerating, increase to 1 mg once weekly  Week 13-15: If tolerating, increase to 1.7 mg once weekly  Week 16 & on: If tolerating, increase to 2.4 mg once weekly  *If you are having some nausea or other side effects to where you are hesitant to move up to the next dose, stay at the same dose you are on for an additional week to see if side effect(s) improves/resolves. Make sure to take this time to hydrate and ensure you are drinking at least 64 oz water per day.  If you are wanting to stay at the same dose and do not have additional refills on that prescription please reach out to the clinic.    The goal is to get to a dose that is well tolerated and effective for you. You do not have to go up on the dose each month or get to maximum dose if getting an effective response with minimal side effects.     Wegovy Administration Video: Video that was created by the .  https://www.Jellynote.com/watch?v=IJ6q6Ai0hX4    Wegovy Storage and Stability:   Make sure that when you get the prescription that you store the prescription in the refrigerator until it is time to use the Wegovy pen.  Once it is time to use the Wegovy pen, you can keep the pen at room temperature and it is good for up to 28 days at room temperature.     Wegovy Common Side Effects:   Nausea, diarrhea, constipation, headache, tiredness (fatigue), dizziness, stomach upset/pain. Less commonly, Wegovy can cause low blood sugar (symptoms: shaky, dizzy, sweaty, agitation). Please reach out to the care team should you feel like this is occurring. It is important to ensure that you are eating consistent meals and not skipping meals. Ensure you are getting at least 64 oz water daily.     If cost is prohibitive, you may try using [employer] coupon (https://www.wegovy.com/coverage-and-savings)        [employer] employees with [employer] insurance ([facility]) are restricted to using the [facility] for Saxenda, Wegovy,  "and Zepbound    [facility]  [place], [place] - [address]  Phone: [phone]    Next steps:  After processing the prescription and saving to your profile, the pharmacy will give you an automated call to inform you that they have your prescription on file. This typically occurs within 1-2 days after the prescription is sent but may take 3-5 business days during high volume times.  You will need to call the pharmacy back to set up the first delivery of every new prescription or new medication dose.   Once you are on a stable dose, you can inquire with the pharmacy about signing up for \"Text to Order through [employer]\", \"Auto Fill\", and/or using the \"[employer]\" tyrone.       Follow-up: 11/19/2024 at 1:00pm with Anabella Piña PharmD       SUBJECTIVE/OBJECTIVE:                          Maria Elena Abreu is a 64 year old female called for an initial visit. She was referred to me from Mejia Martinez.      Reason for visit: [facility] Insurance requirements, .      Allergies/ADRs: Reviewed in chart  Past Medical History: Reviewed in chart  Tobacco: She reports that she has never smoked. She has never used smokeless tobacco.  Alcohol: not currently using      Medication Adherence/Access: no issues reported    Medical History:  MEN2/Medullary Thyroid Cancer: none  Pancreatitis: none  Baseline GI symptomatology: regular BM patterns    Pregnancy: post-menopausal      Weight Management   Has has some effectiveness of SlimFast and Weight Watchers in the past after having kids. Period of time with increased exercising, treadmill. Recognizing benefit and need to include exercise in regular routine again.   11 grandkids, 12th on its way. Harder to get back into exercise routine with schedules.   Impact of hormone changes with menopause (early 40s). Gained 20+ lbs within a few short months.   Medication recommended by primary care provider in seeing weight slowly increasing, discussed options with Dr." "[place].      Current goals:   140 lbs   Crossing legs comfortably, clothes fitting comfortably  Prediabetes diagnosis, rising A1c (family history of Type 2 DM)    Some hesitation with needing to be on medication lifelong is starting. Cost is a concern.   Water: 20 oz water bottle bringing to work, identified need to be more consistent with increasing amount.     Nutrition/Eating Habits: notices some cravings - salty and sweet, recognizes connection between food and emotions (comfort, stress)  [employer] protein shake. Fruit during the day, veggies at night with dinner (broccoli, asparagus). Whole grain bread, oatmeal, enjoys nuts as snacks.     Exercise/Activity: walking 5-6 miles per day with work.     Colonoscopy schedule October 8th, 2024     Medications Tried/Failed:  Phentermine: side effects    Initial Consult Weight: 190 lbs (9/23/2024)     Current weight today: 190 lbs 0 oz    Wt Readings from Last 4 Encounters:   09/23/24 86.2 kg (190 lb)   08/28/24 93.4 kg (206 lb)   04/04/24 94 kg (207 lb 4.8 oz)   03/12/24 81.6 kg (180 lb)     Estimated body mass index is 32.61 kg/m  as calculated from the following:    Height as of this encounter: 1.626 m (5' 4\").    Weight as of this encounter: 86.2 kg (190 lb).          Supplements   MVI 1 tablet daily   Vitamin D3 5000 international unit(s) daily.   Fish oil 1000 mg twice dailly   Calcium citrate 500 mg - magnesium - zinc - vitamin D3 20 mcg (800 international unit(s)) - 1 tablet twice dailly   Ferrous sulfate 45 mg 1 tablet every other day (MWF)  CoQ10 300 mg 1 tablet daily   Nutrafol   [place] doxycylamine succinate   Resveratrol 1 tablet twice dailly (family hx of alzheimers)  No reported issues at this time.        Hyperlipidemia   Rosuvastatin 40 mg daily  Aspirin 81 mg, 1 tablet twice dailly   Patient reports no significant myalgias or other side effects.  The 10-year ASCVD risk score (Haydee MALDONADO, et al., 2019) is: 4.7%    Values used to calculate the score:    "   Age: 64 years      Sex: Female      Is Non- : No      Diabetic: No      Tobacco smoker: No      Systolic Blood Pressure: 126 mmHg      Is BP treated: No      HDL Cholesterol: 46 mg/dL      Total Cholesterol: 131 mg/dL       GERD    Omeprazole 20 mg 3 times each week (MWF)    Patient reports heartburn, belching and eructation - mostly with eating larger meals (ie going out to eat). Will use peptobismol as needed which is effective for as needed symptoms.   Patient feels that current regimen is effective.  The patient does not notice symptoms if they miss a dose. Has not used other preventative medications in the past.        Hormone replacement  Progesterone 100 mg, 1 in the AM       ----------------    I spent 75 minutes with this patient today. All changes were made via collaborative practice agreement with Mejia Martinez and Fina Krisnhan PA-C as one of the credentialed prescriber of the [facility] MTM Weight Mgmt Program.   A copy of the visit note was provided to the patient's provider(s).    A summary of these recommendations was mailed to the patient and was sent via [employer].    Anabella Piña, PharmD, BCACP  Medication Therapy Management (MTM) Pharmacist   [facility]    Telemedicine Visit Details  Type of service:  Telephone visit  Start Time:  1:00 PM  End Time:  2:15 PM     Medication Therapy Recommendations  No medication therapy recommendations to display

## 2024-09-23 NOTE — NURSING NOTE
Current patient location: home    Is the patient currently in the state of [place]? YES    Visit mode:TELEPHONE    If the visit is dropped, the patient can be reconnected by: TELEPHONE VISIT: Phone number: [phone]    Will anyone else be joining the visit? NO  (If patient encounters technical issues they should call [phone] :[phone])    How would you like to obtain your AVS? MyChart    Are changes needed to the allergy or medication list? Pt stated no changes to allergies and Pt stated no med changes    Are refills needed on medications prescribed by this physician? NO    Rooming Documentation:  Not applicable    Reason for visit: Medication Therapy Management    Wt other than 24 hrs:    Pain more than one location:  no  Kendra SEWELL

## 2024-09-23 NOTE — LETTER
9/23/2024      RE: Maria Elena Abreu  [address] [facility] [address]       Dear Colleague,    Thank you for the opportunity to participate in the care of your patient, Maria Elena Abreu, at the [facility]. Please see a copy of my visit note below.    Medication Therapy Management (MTM) Encounter    ASSESSMENT:                            Medication Adherence/Access: No issues identified    Weight management:   Patient would benefit from additional pharmacotherapy for weight management. Given class III obesity, recommend GLP1/GIP therapy as data supports most significant weight loss. Patient also likely to benefit from reduction in food noise and increased satiety. Negative GI symptomatology at baseline. Negative history of pancreatitis, medullary thyroid cancer and multiple endocrine neoplasia type 2.      For patients that are under [employer] insurance coverage, it is mandated by insurance that each qualifying patient meet with hospital based Weight Management Medication Therapy Management pharmacist to continue therapy coverage. The following patient meets the below coverage criteria and can therefore continue GLP-1/GIP agonist therapy for Weight Management:    Adult  BMI >40 with or without comorbidities   OR   BMI >30 + NAFLD*   at time of initiating GLP-1/GIP agonist therapy Approved for 29 weeks  Met Updated Initial Criteria   At least 5% weight loss of baseline body weight  Approved for 12 months      Supplements   Stable.     Hyperlipidemia   High intensity statin appropriate with previous lipid values. Use of aspirin in doses greater than 81 mg daily has not shown increased benefit in primary prevention and increases risk of bleed.     GERD    While current therapy appears to be controlling GERD symptoms, long-term use of PPIs in the elderly is not recommended beyond 8 weeks' duration. This is due to  increased risk of pneumonias as well as hypomagenemia and hypocalcemia (which can lead to fractures in the elderly patient at risk for falls). Patient's symptoms may be controlled with step-down therapy of cost-effective generic H2RA, famotidine. Since patient's CrCl is >60 mL/min, dose of 20 mg once daily is appropriate to prevent rebound reflux.    Hormone replacement  Stable.     PLAN:                            Pharmacist to start Prior Authorization on Wegovy. Once approved, to start Wegovy 0.25 mg subcutaneous once weekly for 4 weeks, then if tolerating increase to 0.5 mg weekly thereafter.  RX will be sent to [facility] for both 0.25 mg and 0.5 mg doses. The 0.25 mg dose will be filled. The 0.5 mg dose will be put on hold. Call the pharmacy when ready to fill the 0.5 mg box.   Educated on mechanism, adverse effects, monitoring, safety, administration with use of Wegovy.   Decrease aspirin 81 mg to 1 tablet daily.   Change omeprazole to famotidine 20 mg as needed. You can get this over the counter.   Schedule follow-up with Dr. Gamboa.       To help with tolerability and effectiveness of Wegovy:  Eat small meals/snacks throughout the day (about every 2-4 hours)  Focus on getting protein in first with each meal and snack.   A good starting goal is 60 g protein daily (track this, especially if at weight loss plateau). Once you consistently are getting 60g daily, try getting 90 g protein daily.  Drink plenty of water - goal 64 oz throughout the day  You may try Metamucil, Benefiber, or Citrucel to help feel more full (less nausea) and have softer, more consistent bowel movements.  To optimize weight management - work on incorporating resistance training/weight lifting to build muscle and improve overall metabolism of adipose tissue.      Wegovy Dosing:   Start Wegovy: It is a subcutaneous injection that you inject once weekly and titrate the dose slowly over time. Make sure inject  the same time each day of the week, for example Monday evenings.  Each pen is single use.  In each prescription, you will get 4 pens in each box.  You will need a new prescription for each strength of the medication.  Week 1-4: Inject 0.25 mg once weekly  Week 5-8: If tolerating, increase to 0.5 mg once weekly  Week 9-12: If tolerating, increase to 1 mg once weekly  Week 13-15: If tolerating, increase to 1.7 mg once weekly  Week 16 & on: If tolerating, increase to 2.4 mg once weekly  *If you are having some nausea or other side effects to where you are hesitant to move up to the next dose, stay at the same dose you are on for an additional week to see if side effect(s) improves/resolves. Make sure to take this time to hydrate and ensure you are drinking at least 64 oz water per day.  If you are wanting to stay at the same dose and do not have additional refills on that prescription please reach out to the clinic.    The goal is to get to a dose that is well tolerated and effective for you. You do not have to go up on the dose each month or get to maximum dose if getting an effective response with minimal side effects.     Wegovy Administration Video: Video that was created by the .  https://www.[employer].com/watch?v=RN8y8If1oK5    Wegovy Storage and Stability:   Make sure that when you get the prescription that you store the prescription in the refrigerator until it is time to use the Wegovy pen.  Once it is time to use the Wegovy pen, you can keep the pen at room temperature and it is good for up to 28 days at room temperature.     Wegovy Common Side Effects:   Nausea, diarrhea, constipation, headache, tiredness (fatigue), dizziness, stomach upset/pain. Less commonly, Wegovy can cause low blood sugar (symptoms: shaky, dizzy, sweaty, agitation). Please reach out to the care team should you feel like this is occurring. It is important to ensure that you are eating consistent meals and not skipping meals.  "Ensure you are getting at least 64 oz water daily.     If cost is prohibitive, you may try using Wegovy coupon (https://www.wegovy.com/coverage-and-savings)        [employer] employees with [employer] insurance ([facility]) are restricted to using the [facility] for Saxenda, Wegovy, and Zepbound    [facility]  [place], [place] - [address]  Phone: [phone]    Next steps:  After processing the prescription and saving to your profile, the pharmacy will give you an automated call to inform you that they have your prescription on file. This typically occurs within 1-2 days after the prescription is sent but may take 3-5 business days during high volume times.  You will need to call the pharmacy back to set up the first delivery of every new prescription or new medication dose.   Once you are on a stable dose, you can inquire with the pharmacy about signing up for \"Text to Order through [employer]\", \"Auto Fill\", and/or using the \"My [employer] Rx\" tyrone.       Follow-up: 11/19/2024 at 1:00pm with Anabella Piña PharmD       SUBJECTIVE/OBJECTIVE:                          Maria Elena Abreu is a 64 year old female called for an initial visit. She was referred to me from Mejia Martinez.      Reason for visit: [facility] Insurance requirements, .      Allergies/ADRs: Reviewed in chart  Past Medical History: Reviewed in chart  Tobacco: She reports that she has never smoked. She has never used smokeless tobacco.  Alcohol: not currently using      Medication Adherence/Access: no issues reported    Medical History:  MEN2/Medullary Thyroid Cancer: none  Pancreatitis: none  Baseline GI symptomatology: regular BM patterns    Pregnancy: post-menopausal      Weight Management  Has has some effectiveness of SlimFast and Weight Watchers in the past after having kids. Period of time with increased exercising, treadmill. Recognizing benefit and need to include exercise in " "regular routine again.   11 grandkids, 12th on its way. Harder to get back into exercise routine with schedules.   Impact of hormone changes with menopause (early 40s). Gained 20+ lbs within a few short months.   Medication recommended by primary care provider in seeing weight slowly increasing, discussed options with Dr. Gamboa.      Current goals:   140 lbs   Crossing legs comfortably, clothes fitting comfortably  Prediabetes diagnosis, rising A1c (family history of Type 2 DM)    Some hesitation with needing to be on medication lifelong is starting. Cost is a concern.   Water: 20 oz water bottle bringing to work, identified need to be more consistent with increasing amount.     Nutrition/Eating Habits: notices some cravings - salty and sweet, recognizes connection between food and emotions (comfort, stress)  [employer] protein shake. Fruit during the day, veggies at night with dinner (broccoli, asparagus). Whole grain bread, oatmeal, enjoys nuts as snacks.     Exercise/Activity: walking 5-6 miles per day with work.     Colonoscopy schedule October 8th, 2024     Medications Tried/Failed:  Phentermine: side effects    Initial Consult Weight: 190 lbs (9/23/2024)     Current weight today: 190 lbs 0 oz    Wt Readings from Last 4 Encounters:   09/23/24 86.2 kg (190 lb)   08/28/24 93.4 kg (206 lb)   04/04/24 94 kg (207 lb 4.8 oz)   03/12/24 81.6 kg (180 lb)     Estimated body mass index is 32.61 kg/m  as calculated from the following:    Height as of this encounter: 1.626 m (5' 4\").    Weight as of this encounter: 86.2 kg (190 lb).          Supplements  MVI 1 tablet daily   Vitamin D3 5000 international unit(s) daily.   Fish oil 1000 mg twice dailly   Calcium citrate 500 mg - magnesium - zinc - vitamin D3 20 mcg (800 international unit(s)) - 1 tablet twice dailly   Ferrous sulfate 45 mg 1 tablet every other day (MWF)  CoQ10 300 mg 1 tablet daily   Nutrafol   Arnold doxycylamine succinate   Resveratrol 1 tablet twice " mariusz (family hx of alzheimers)  No reported issues at this time.        Hyperlipidemia  Rosuvastatin 40 mg daily  Aspirin 81 mg, 1 tablet twice dailly   Patient reports no significant myalgias or other side effects.  The 10-year ASCVD risk score (Haydee MALDONADO, et al., 2019) is: 4.7%    Values used to calculate the score:      Age: 64 years      Sex: Female      Is Non- : No      Diabetic: No      Tobacco smoker: No      Systolic Blood Pressure: 126 mmHg      Is BP treated: No      HDL Cholesterol: 46 mg/dL      Total Cholesterol: 131 mg/dL       GERD   Omeprazole 20 mg 3 times each week (MWF)    Patient reports heartburn, belching and eructation - mostly with eating larger meals (ie going out to eat). Will use peptobismol as needed which is effective for as needed symptoms.   Patient feels that current regimen is effective.  The patient does not notice symptoms if they miss a dose. Has not used other preventative medications in the past.        Hormone replacement  Progesterone 100 mg, 1 in the AM       ----------------    I spent 75 minutes with this patient today. All changes were made via collaborative practice agreement with Mejia Martinez and Fina Krishnan PA-C as one of the credentialed prescriber of the [facility].   A copy of the visit note was provided to the patient's provider(s).    A summary of these recommendations was mailed to the patient and was sent via [employer].    Anabella Piña, PharmD, BCACP  Medication Therapy Management (MTM) Pharmacist   [facility]    Telemedicine Visit Details  Type of service:  Telephone visit  Start Time:  1:00 PM  End Time:  2:15 PM     Medication Therapy Recommendations  No medication therapy recommendations to display       Please do not hesitate to contact me if you have any questions/concerns.     Sincerely,     Anabella Piña RP

## 2024-09-24 ENCOUNTER — TELEPHONE (OUTPATIENT)
Dept: ENDOCRINOLOGY | Facility: CLINIC | Age: 64
End: 2024-09-24
Payer: COMMERCIAL

## 2024-09-24 NOTE — TELEPHONE ENCOUNTER
"Prior Authorization Retail Medication Request    Medication/Dose: [facility] insurance requirements Wegovy 0.25 mg - 2.4 mg (all doses) as indicated by supply, safety, and efficacy.    Diagnosis and ICD code (if different than what is on RX):    New/renewal/insurance change PA/secondary ins. PA:    Rationale: Maria Elena Abreu is a 64 year old female with a diagnosis of Obesity (BMI at least 30 kg/m2) and hepatic steatosis. Patient met with [facility] Medication Therapy Management Pharmacist 9/23/2024 per [facility] insurance requirements. Patient denies personal or family history of MEN Type2, MTC, Pancreatitis.     Patient would benefit from additional pharmacotherapy for weight management. Given class III obesity, recommend GLP1/GIP therapy as data supports most significant weight loss. Patient also likely to benefit from reduction in food noise and increased satiety. Negative GI symptomatology at baseline. Negative history of pancreatitis, medullary thyroid cancer and multiple endocrine neoplasia type 2.      For patients that are under [employer] insurance coverage, it is mandated by insurance that each qualifying patient meet with hospital based Weight Management Medication Therapy Management pharmacist to continue therapy coverage. The following patient meets the below coverage criteria and can therefore continue GLP-1/GIP agonist therapy for Weight Management:    Adult  BMI >40 with or without comorbidities   OR   BMI >30 + NAFLD*   at time of initiating GLP-1/GIP agonist therapy Approved for 29 weeks  Met Updated Initial Criteria   At least 5% weight loss of baseline body weight  Approved for 12 months          Estimated body mass index is 32.61 kg/m  as calculated from the following:    Height as of 9/23/24: 1.626 m (5' 4\").    Weight as of 9/23/24: 86.2 kg (190 lb).     Insurance       Pharmacy Information (if different than what is on RX)  Name:    Phone:  "   Fax:

## 2024-09-25 NOTE — TELEPHONE ENCOUNTER
PA Initiation    Medication: WEGOVY 0.25 MG/0.5ML SC SOAJ  Insurance Company: [employer] - Phone [phone] Fax [phone]  Pharmacy Filling the Rx: [facility] - [place], [place] - [address]  Filling Pharmacy Phone:    Filling Pharmacy Fax:    Start Date: 9/25/2024    Key: [id number]

## 2024-09-26 NOTE — TELEPHONE ENCOUNTER
Prior Authorization Approval    Medication: WEGOVY 0.25 MG/0.5ML SC SOAJ  Authorization Effective Date: 9/25/2024  Authorization Expiration Date: 12/31/2024  Approved Dose/Quantity: 2ml per 28 days  Reference #: Key: [id number]   Insurance Company: [employer] - Phone [phone] Fax [phone]  Expected CoPay: $ 75  CoPay Card Available: Yes    Financial Assistance Needed: yes  Which Pharmacy is filling the prescription: [facility] - [place], [place] - [address]  Pharmacy Notified: yes  Patient Notified: yes

## 2024-10-08 ENCOUNTER — TRANSFERRED RECORDS (OUTPATIENT)
Dept: HEALTH INFORMATION MANAGEMENT | Facility: CLINIC | Age: 64
End: 2024-10-08
Payer: COMMERCIAL

## 2024-10-09 ENCOUNTER — TRANSFERRED RECORDS (OUTPATIENT)
Dept: HEALTH INFORMATION MANAGEMENT | Facility: CLINIC | Age: 64
End: 2024-10-09
Payer: COMMERCIAL

## 2024-10-16 ENCOUNTER — ANCILLARY PROCEDURE (OUTPATIENT)
Dept: MAMMOGRAPHY | Facility: HOSPITAL | Age: 64
End: 2024-10-16
Payer: COMMERCIAL

## 2024-10-16 DIAGNOSIS — Z12.31 VISIT FOR SCREENING MAMMOGRAM: ICD-10-CM

## 2024-10-16 PROCEDURE — 77067 SCR MAMMO BI INCL CAD: CPT

## 2024-11-05 ENCOUNTER — TELEPHONE (OUTPATIENT)
Dept: CARDIOLOGY | Facility: CLINIC | Age: 64
End: 2024-11-05
Payer: COMMERCIAL

## 2024-11-05 NOTE — TELEPHONE ENCOUNTER
Returned call to patient. Currently on Wegovy 0.25 mg, x 3 doses. Wondering about increasing to 0.5 mg dose with upcoming insurance changes. Provided general information regarding GLP1 cash options for continuing medication in 2025 or can consider alternate medication options. Patient agreed to discuss specifics further at follow-up scheduled on 11/19/24.     Interested in meeting with registered dietitian, gave  clinic phone number to schedule appointment.     Anabella Piña, PharmD, BCACP  Medication Therapy Management (MTM) Pharmacist   [facility] [employer]

## 2024-11-05 NOTE — TELEPHONE ENCOUNTER
Medication Question or Refill        What medication are you calling about (include dose and sig)?: Semaglutide-Weight Management (WEGOVY) 0.25 MG/0.5ML pen     Preferred Pharmacy:   [facility] - [place], [place] - [address] [facility] [address] [facility] [address]  Phone: [phone] Fax: [phone]          Controlled Substance Agreement on file:   CSA -- Patient Level:    CSA: None found at the patient level.       Who prescribed the medication?: Anabella Piña    Do you need a refill? No    When did you use the medication last? 11/3/24    Patient offered an appointment? No    Do you have any questions or concerns?  Yes: patient asking about her medication due to it not being covered starting 1/1/25      Could we send this information to you in [employer] or would you prefer to receive a phone call?:   Patient would prefer a phone call   Okay to leave a detailed message?: Yes at Cell number on file:    Telephone Information:   Mobile [phone]

## 2024-11-19 ENCOUNTER — OFFICE VISIT (OUTPATIENT)
Dept: FAMILY MEDICINE | Facility: CLINIC | Age: 64
End: 2024-11-19
Payer: COMMERCIAL

## 2024-11-19 ENCOUNTER — VIRTUAL VISIT (OUTPATIENT)
Dept: PHARMACY | Facility: CLINIC | Age: 64
End: 2024-11-19
Attending: FAMILY MEDICINE
Payer: COMMERCIAL

## 2024-11-19 VITALS — BODY MASS INDEX: 35 KG/M2 | WEIGHT: 205 LBS | HEIGHT: 64 IN

## 2024-11-19 VITALS
RESPIRATION RATE: 18 BRPM | DIASTOLIC BLOOD PRESSURE: 84 MMHG | TEMPERATURE: 98.8 F | WEIGHT: 205.9 LBS | HEIGHT: 64 IN | OXYGEN SATURATION: 95 % | HEART RATE: 90 BPM | SYSTOLIC BLOOD PRESSURE: 136 MMHG | BODY MASS INDEX: 35.15 KG/M2

## 2024-11-19 DIAGNOSIS — E66.812 CLASS 2 OBESITY DUE TO EXCESS CALORIES IN ADULT, UNSPECIFIED BMI, UNSPECIFIED WHETHER SERIOUS COMORBIDITY PRESENT: Primary | ICD-10-CM

## 2024-11-19 DIAGNOSIS — N39.0 ACUTE UTI (URINARY TRACT INFECTION): ICD-10-CM

## 2024-11-19 DIAGNOSIS — B37.31 YEAST INFECTION OF THE VAGINA: ICD-10-CM

## 2024-11-19 DIAGNOSIS — R30.0 DYSURIA: Primary | ICD-10-CM

## 2024-11-19 DIAGNOSIS — E66.09 CLASS 2 OBESITY DUE TO EXCESS CALORIES IN ADULT, UNSPECIFIED BMI, UNSPECIFIED WHETHER SERIOUS COMORBIDITY PRESENT: Primary | ICD-10-CM

## 2024-11-19 LAB
BACTERIA #/AREA URNS HPF: ABNORMAL /HPF
HYALINE CASTS #/AREA URNS LPF: ABNORMAL /LPF
MUCOUS THREADS #/AREA URNS LPF: PRESENT /LPF
RBC #/AREA URNS AUTO: ABNORMAL /HPF
SQUAMOUS #/AREA URNS AUTO: ABNORMAL /LPF
TRANS CELLS #/AREA URNS HPF: ABNORMAL /HPF
WBC #/AREA URNS AUTO: ABNORMAL /HPF
WBC CLUMPS #/AREA URNS HPF: PRESENT /HPF

## 2024-11-19 PROCEDURE — 81015 MICROSCOPIC EXAM OF URINE: CPT | Performed by: NURSE PRACTITIONER

## 2024-11-19 PROCEDURE — 99213 OFFICE O/P EST LOW 20 MIN: CPT | Performed by: NURSE PRACTITIONER

## 2024-11-19 RX ORDER — METFORMIN HYDROCHLORIDE 500 MG/1
500 TABLET, EXTENDED RELEASE ORAL
Qty: 60 TABLET | Refills: 2 | Status: SHIPPED | OUTPATIENT
Start: 2024-11-19

## 2024-11-19 RX ORDER — FLUCONAZOLE 150 MG/1
150 TABLET ORAL ONCE
Qty: 1 TABLET | Refills: 0 | Status: SHIPPED | OUTPATIENT
Start: 2024-11-19 | End: 2024-11-19

## 2024-11-19 RX ORDER — NITROFURANTOIN 25; 75 MG/1; MG/1
100 CAPSULE ORAL 2 TIMES DAILY
Qty: 10 CAPSULE | Refills: 0 | Status: SHIPPED | OUTPATIENT
Start: 2024-11-19 | End: 2024-11-24

## 2024-11-19 ASSESSMENT — PAIN SCALES - GENERAL: PAINLEVEL_OUTOF10: NO PAIN (0)

## 2024-11-19 ASSESSMENT — ENCOUNTER SYMPTOMS: DYSURIA: 1

## 2024-11-19 NOTE — PROGRESS NOTES
"Assessment & Plan     Dysuria  UA consistent with UTI.    - UA Macroscopic with reflex to Microscopic and Culture - Lab Collect; Future  - UA Microscopic with Reflex to Culture  - Urine Culture    Yeast infection of the vagina  Discussed to only take if develops symptoms of yeast-burning, itching, vaginal discharge. Recommend waiting until end of antibiotic course.     - fluconazole (DIFLUCAN) 150 MG tablet; Take 1 tablet (150 mg) by mouth once for 1 dose.    Acute UTI (urinary tract infection)  Push fluids. Can continue azo for burning if needed.    - nitroFURantoin macrocrystal-monohydrate (MACROBID) 100 MG capsule; Take 1 capsule (100 mg) by mouth 2 times daily for 5 days.          BMI  Estimated body mass index is 35.32 kg/m  as calculated from the following:    Height as of this encounter: 1.626 m (5' 4.02\").    Weight as of this encounter: 93.4 kg (205 lb 14.4 oz).           I have seen and evaluated patient with the RN. This document represents decision making and discussion with the patient.     Zofia Avila NP    Nadya Arredondo is a 64 year old, presenting for the following health issues:  Dysuria (Burning, retention, and a little bit of frequency for x2 days. Denied back/flank pain or fever.)        11/19/2024    11:22 AM   Additional Questions   Roomed by LEONARD Carvajal     Dysuria     Patient reports yeast infections post antibiotic use.                    Objective    /84 (BP Location: Right arm, Patient Position: Sitting, Cuff Size: Adult Regular)   Pulse 90   Temp 98.8  F (37.1  C) (Oral)   Resp 18   Ht 1.626 m (5' 4.02\")   Wt 93.4 kg (205 lb 14.4 oz)   LMP 07/26/2005 (Exact Date)   SpO2 95%   BMI 35.32 kg/m    Body mass index is 35.32 kg/m .  Physical Exam  Constitutional:       Appearance: Normal appearance.   HENT:      Head: Normocephalic.      Right Ear: Tympanic membrane normal.      Left Ear: Tympanic membrane normal.      Nose: Nose normal.      Mouth/Throat:      Mouth: Mucous " membranes are moist.   Eyes:      Pupils: Pupils are equal, round, and reactive to light.   Cardiovascular:      Rate and Rhythm: Normal rate and regular rhythm.      Heart sounds: Normal heart sounds.   Pulmonary:      Effort: Pulmonary effort is normal.   Abdominal:      General: Bowel sounds are normal.   Genitourinary:     Urethra: Urethral pain present.   Neurological:      Mental Status: She is alert and oriented to person, place, and time.   Psychiatric:         Mood and Affect: Mood normal.                    Signed Electronically by: ESSIE TALLEY CNP

## 2024-11-19 NOTE — NURSING NOTE
Current patient location: home     Is the patient currently in the state of [place]? YES    Visit mode:TELEPHONE    If the visit is dropped, the patient can be reconnected by: TELEPHONE VISIT: Phone number: [phone]    Will anyone else be joining the visit? NO  (If patient encounters technical issues they should call [phone])    Are changes needed to the allergy or medication list? Pt stated no changes to allergies and Pt stated no med changes  Pt stated not taking Wegovy due to ins.  Are refills needed on medications prescribed by this physician? NO    Rooming Documentation:  Not applicable      Reason for visit: Medication Therapy Management    Wt other than 24 hrs:    Pain more than one location:  no  Kendra SEWELL

## 2024-11-19 NOTE — Clinical Note
Hi Dr. Gamboa,   I met with Maria Elena today for follow-up. Unfortunately she's being impacted by the [employer] insurance changes and will not be able to continue Wegovy next year. She opted to stop the medication at this time and consider alternate options. We agreed with starting metformin for now given concurrent pre-diabetes. She's also focusing on lifestyle/diet changes and we discussed considering phentermine in the future. I asked that she follow-up in 2-3 months - with you if able or can follow-up with me if availability is farther out.   Let me know if you have any questions or concerns!  Anabella Piña, PharmD

## 2024-11-19 NOTE — PROGRESS NOTES
Medication Therapy Management (MTM) Encounter    ASSESSMENT:                            Medication Adherence/Access: No issues identified.    Weight management   patient would benefit from additional pharmacotherapy for weight management. Given concurrent pre-diabetes diagnosis, metformin would be effective in decreasing insulin resistance and may decrease appetite slightly. Estimated GFR is > 60 mL/min, initiating metformin is safe at this time.  Combination therapy reasonable to consider, focusing on appetite over cravings and food noise. Patient also focusing on lifestyle modifications. Consider addition of phentermine in the future if needed.       PLAN:                            Start metformin  mg once daily, increase to twice daily after 2 weeks if tolerating.   Schedule follow-up with Dr. Gamboa.     Follow-up: 2-3 months with Dr. Gamboa or Anabella Piña, PharmD depending on Dr. Gamboa' availability    SUBJECTIVE/OBJECTIVE:                          Maria Elena Abreu is a 64 year old female seen for a follow-up visit.       Reason for visit: [facility] follow-up.    Allergies/ADRs: Reviewed in chart  Past Medical History: Reviewed in chart  Tobacco: She reports that she has never smoked. She has never used smokeless tobacco.  Alcohol: not currently using    Medication Adherence/Access: GLP-1 RA not covered on formulary next year      Pharmacist to start Prior Authorization on Wegovy. Once approved, to start Wegovy 0.25 mg subcutaneous once weekly for 4 weeks, then if tolerating increase to 0.5 mg weekly thereafter.  RX will be sent to [facility] for both 0.25 mg and 0.5 mg doses. The 0.25 mg dose will be filled. The 0.5 mg dose will be put on hold. Call the pharmacy when ready to fill the 0.5 mg box.   Educated on mechanism, adverse effects, monitoring, safety, administration with use of Wegovy.   Decrease aspirin 81 mg to 1 tablet daily.   Change omeprazole to famotidine 20  mg as needed. You can get this over the counter.   Schedule follow-up with Dr. Gamboa.       Weight Management   Wegovy 0.25 x 4 doses  - decided not to start 0.5 mg dose given change in insurance and not able to continue medication going forward. Interested in alternate oral options.     Notes some bloating, constipation   Had dental work over past several weeks -  Eating every 4-6 hours with Advil for tooth pain. Even overnight given extent of pain.     Nutrition/Eating Habits: Restarted weight watchers to help with food choices.   Doesn't feel to be experiencing as much food noise - eating on regular schedule. Working on portion.  notices some cravings - salty and sweet, recognizes connection between food and emotions (comfort, stress)  [employer] protein shake. Fruit during the day, veggies at night with dinner (broccoli, asparagus). Whole grain bread, oatmeal, enjoys nuts as snacks.  Water: 20 oz water bottle bringing to work, identified need to be more consistent with increasing amount.     9/23/24 visit:  Has has some effectiveness of SlimFast and Weight Watchers in the past after having kids. Period of time with increased exercising, treadmill. Recognizing benefit and need to include exercise in regular routine again.   11 grandkids, 12th on its way. Harder to get back into exercise routine with schedules.   Impact of hormone changes with menopause (early 40s). Gained 20+ lbs within a few short months.   Medication recommended by primary care provider in seeing weight slowly increasing, discussed options with Dr. Gamboa.      Current goals:   140 lbs   Crossing legs comfortably, clothes fitting comfortably  Prediabetes diagnosis, rising A1c (family history of Type 2 DM)      Exercise/Activity: walking 5-6 miles per day with work.       Medications Tried/Failed:  Phentermine: unsure why she stopped in the past, several years ago before having kids    Medication Considerations:  Phentermine: no hypertension,  "anxiety, cardiac history  Topiramate: No kidney stones, no fatigue or cognitive concerns  Naltrexone: No liver dysfunction, no chronic opiate use  Bupropion: no seizure, hypertension, or anxiety history    Metformin: no kidney dysfunction   GLP1: no insurance coverage.     Initial Consult Weight: 190 lbs (9/23/2024)       Wt Readings from Last 4 Encounters:   11/19/24 205 lb (93 kg)   11/19/24 205 lb 14.4 oz (93.4 kg)   09/23/24 190 lb (86.2 kg)   08/28/24 206 lb (93.4 kg)     Estimated body mass index is 35.19 kg/m  as calculated from the following:    Height as of this encounter: 5' 4\" (1.626 m).    Weight as of this encounter: 205 lb (93 kg).      Today's Vitals: Ht 5' 4\" (1.626 m)   Wt 205 lb (93 kg)   LMP 07/26/2005 (Exact Date)   BMI 35.19 kg/m    ----------------      I spent 25 minutes with this patient today. All changes were made via collaborative practice agreement with Mejia Martinez. A copy of the visit note was provided to the patient's provider(s).    A summary of these recommendations was sent via clinic portal.    Anabella Piña, PharmD, BCACP  Medication Therapy Management (MTM) Pharmacist   [facility]     Telemedicine Visit Details  The patient's medications can be safely assessed via a telemedicine encounter.  Type of service:  Telephone visit  Originating Location (pt. Location): Home    Distant Location (provider location):  On-site  Start Time:  12:59 PM  End Time:  1:24 PM     Medication Therapy Recommendations  Class 2 obesity due to excess calories in adult, unspecified BMI, unspecified whether serious comorbidity present   1 Rationale: Medication product not available - Adherence - Adherence   Recommendation: Change Medication   Status: Accepted per CPA   Identified Date: 11/19/2024 Completed Date: 11/19/2024            "

## 2024-11-19 NOTE — PATIENT INSTRUCTIONS
"Recommendations from today's MTM visit:                                                    Start metformin  mg once daily, increase to twice daily after 2 weeks if tolerating.     Follow-up: 2-3 months with Dr. Gamboa or Anabella Piña, PharmD depending on Dr. Gamboa' availability    It was great speaking with you today.  I value your experience and would be very thankful for your time in providing feedback in our clinic survey. In the next few days, you may receive an email or text message from [facility] [employer] with a link to a survey related to your  clinical pharmacist.\"     To schedule another MTM appointment, please call the clinic directly or you may call the MTM scheduling line at [phone] or toll-free at [phone].     My Clinical Pharmacist's contact information:                                                      Please feel free to contact me with any questions or concerns you have.      Anabella Piña, PharmD, [facility]  Medication Therapy Management (MTM) Pharmacist   [facility]     "

## 2024-11-19 NOTE — PATIENT INSTRUCTIONS
Urinary Tract Infection (UTI) in Women: Care Instructions  Overview     A urinary tract infection (UTI) is an infection caused by bacteria. It can happen anywhere in the urinary tract. A UTI can happen in the:  Kidneys.  Ureters, the tubes that connect the kidneys to the bladder.  Bladder.  Urethra, where the urine comes out.  Most UTIs are bladder infections. They often cause pain or burning when you urinate.  Most UTIs can be cured with antibiotics. If you are prescribed antibiotics, be sure to complete your treatment so that the infection does not get worse.  Follow-up care is a key part of your treatment and safety. Be sure to make and go to all appointments, and call your doctor if you are having problems. It's also a good idea to know your test results and keep a list of the medicines you take.  How can you care for yourself at home?  Take your antibiotics as directed. Do not stop taking them just because you feel better. You need to take the full course of antibiotics.  Drink extra water and other fluids for the next day or two. This will help make the urine less concentrated and help wash out the bacteria that are causing the infection. (If you have kidney, heart, or liver disease and have to limit fluids, talk with your doctor before you increase the amount of fluids you drink.)  Avoid drinks that are carbonated or have caffeine. They can irritate the bladder.  Urinate often. Try to empty your bladder each time.  To relieve pain, take a hot bath or lay a heating pad set on low over your lower belly or genital area. Never go to sleep with a heating pad in place.  To prevent UTIs  Drink plenty of water each day. This helps you urinate often, which clears bacteria from your system. (If you have kidney, heart, or liver disease and have to limit fluids, talk with your doctor before you increase the amount of fluids you drink.)  Urinate when you need to.  If you are sexually active, urinate right after you have  "sex.  Change sanitary pads often.  Avoid douches, bubble baths, feminine hygiene sprays, and other feminine hygiene products that have deodorants.  After going to the bathroom, wipe from front to back.  When should you call for help?   Call your doctor now or seek immediate medical care if:    You have new or worse fever, chills, nausea, or vomiting.     You have new pain in your back just below your rib cage. This is called flank pain.     There is new blood or pus in your urine.     You have any problems with your antibiotic medicine.   Watch closely for changes in your health, and be sure to contact your doctor if:    You are not getting better after taking an antibiotic for 2 days.     Your symptoms go away but then come back.   Where can you learn more?  Go to https://www.[employer].net/patiented  Enter K848 in the search box to learn more about \"Urinary Tract Infection (UTI) in Women: Care Instructions.\"  Current as of: November 15, 2023  Content Version: 14.2 2024 [facility] [employer].   Care instructions adapted under license by your healthcare professional. If you have questions about a medical condition or this instruction, always ask your healthcare professional. [employer] disclaims any warranty or liability for your use of this information.    "

## 2024-11-21 LAB — BACTERIA UR CULT: ABNORMAL

## 2025-02-09 ENCOUNTER — OFFICE VISIT (OUTPATIENT)
Dept: URGENT CARE | Facility: URGENT CARE | Age: 65
End: 2025-02-09
Payer: COMMERCIAL

## 2025-02-09 VITALS
DIASTOLIC BLOOD PRESSURE: 80 MMHG | SYSTOLIC BLOOD PRESSURE: 127 MMHG | TEMPERATURE: 98.9 F | BODY MASS INDEX: 35.36 KG/M2 | WEIGHT: 206 LBS | HEART RATE: 92 BPM | OXYGEN SATURATION: 95 % | RESPIRATION RATE: 16 BRPM

## 2025-02-09 DIAGNOSIS — N30.00 ACUTE CYSTITIS WITHOUT HEMATURIA: Primary | ICD-10-CM

## 2025-02-09 PROCEDURE — 99213 OFFICE O/P EST LOW 20 MIN: CPT | Performed by: PHYSICIAN ASSISTANT

## 2025-02-09 RX ORDER — NITROFURANTOIN 25; 75 MG/1; MG/1
100 CAPSULE ORAL 2 TIMES DAILY
Qty: 10 CAPSULE | Refills: 0 | Status: SHIPPED | OUTPATIENT
Start: 2025-02-09 | End: 2025-02-14

## 2025-02-09 ASSESSMENT — PAIN SCALES - GENERAL: PAINLEVEL_OUTOF10: NO PAIN (0)

## 2025-02-09 NOTE — PROGRESS NOTES
Acute cystitis without hematuria  - nitroFURantoin macrocrystal-monohydrate (MACROBID) 100 MG capsule; Take 1 capsule (100 mg) by mouth 2 times daily for 5 days.    General Tips for All Ages:    Hydration:  Why: Drinking plenty of water helps flush out bacteria from the urinary system.  What to Do: Aim for at least 8 glasses of water per day.    Cranberry Juice:  Why: Some studies suggest cranberry juice may help prevent bacterial adherence in the urinary tract.  What to Do: Drink pure, unsweetened cranberry juice. Limit added sugars.    For Adults (18 Years and Older):    Over-the-Counter (OTC) Pain Relievers:  Why: Relieves discomfort and pain.  What to Use: Ibuprofen or acetaminophen as directed on the package.    Urinary Alkalinizers (if prescribed):  Why: Alters the acidity of the urine, providing relief.  What to Do: Take as prescribed by your healthcare provider.    Antibiotics (if prescribed):  Why: Eliminates the bacterial infection.  What to Do: Take the full course of antibiotics as directed, even if symptoms improve.    For Adolescents (12-17 Years):    OTC Pain Relievers:  Why: Manages pain and discomfort.  What to Use: Ibuprofen or acetaminophen following package instructions.    Hydration:  Why: Helps flush out bacteria.  What to Do: Drink plenty of water; avoid sugary drinks.    Seek Medical Attention:  When: If symptoms persist or worsen.  What to Do: Contact your healthcare provider for further evaluation.    For Children (Under 12 Years):    Hydration:  Why: Promotes urinary system flushing.  What to Do: Encourage your child to drink water regularly.    Avoid Irritants:  Why: Certain soaps or bubble baths can worsen symptoms.  What to Do: Use gentle, fragrance-free products for bathing.    OTC Pain Relievers (if approved by pediatrician):  Why: Eases pain and discomfort.  What to Use: Follow your pediatrician's advice on using ibuprofen or acetaminophen.    Seek Medical Attention:  When: If  symptoms persist or if your child has a high fever.  What to Do: Contact your child's pediatrician for guidance.    All Ages:    Probiotics:  Why: May help restore healthy bacteria in the gut.  What to Do: Consider including probiotic-rich foods or supplements.    Avoid Irritants:  Why: Certain foods and drinks can worsen symptoms.  What to Do: Limit caffeine, spicy foods, and alcohol during the infection.    Follow-up:    Patient advised to return to clinic for reevaluation (either UC or PCP) if symptoms do not improve in 7 days. Patient educated on red flag symptoms and asked to go directly to the ED if these symptoms present themselves.     Remember, individual cases may vary, and it's crucial to follow your healthcare provider's advice. If you have concerns or if symptoms persist, don't hesitate to reach out for further guidance.    Wishing you a [place] recovery!      DANITZA Carlos [facility]    Subjective   64 year old who presents to clinic today for the following health issues:    Dysuria       HPI     Genitourinary - Female  Onset/Duration: Today   Description:   Painful urination (Dysuria): YES           Frequency: YES  Blood in urine (Hematuria): No  Delay in urine (Hesitency): No  Intensity: mild  Progression of Symptoms:  worsening  Accompanying Signs & Symptoms:  Fever/chills: No  Flank pain: No  Nausea and vomiting: No  Vaginal symptoms: none  Abdominal/Pelvic Pain: No  History:   History of frequent UTI s: YES  History of kidney stones: No  Precipitating or alleviating factors: None  Therapies tried and outcome: Patient took a dose of Doxycyline today as well as Pyridium     Review of Systems   Review of Systems   See HPI    Objective    Temp: 98.9  F (37.2  C) Temp src: Oral BP: 127/80 Pulse: 92   Resp: 16 SpO2: 95 %       Physical Exam   Physical Exam  Constitutional:       General: She is not in acute distress.     Appearance: Normal appearance. She is normal weight. She  is not ill-appearing, toxic-appearing or diaphoretic.   HENT:      Head: Normocephalic and atraumatic.   Cardiovascular:      Rate and Rhythm: Normal rate.      Pulses: Normal pulses.   Pulmonary:      Effort: Pulmonary effort is normal. No respiratory distress.   Abdominal:      Tenderness: There is no right CVA tenderness or left CVA tenderness.   Neurological:      General: No focal deficit present.      Mental Status: She is alert and oriented to person, place, and time. Mental status is at baseline.      Gait: Gait normal.   Psychiatric:         Mood and Affect: Mood normal.         Behavior: Behavior normal.         Thought Content: Thought content normal.         Judgment: Judgment normal.          No results found for this or any previous visit (from the past 24 hours).

## 2025-02-18 DIAGNOSIS — E78.5 HYPERLIPIDEMIA, UNSPECIFIED HYPERLIPIDEMIA TYPE: ICD-10-CM

## 2025-02-18 RX ORDER — ROSUVASTATIN CALCIUM 40 MG/1
40 TABLET, COATED ORAL AT BEDTIME
Qty: 90 TABLET | Refills: 0 | Status: SHIPPED | OUTPATIENT
Start: 2025-02-18

## 2025-03-11 ENCOUNTER — OFFICE VISIT (OUTPATIENT)
Dept: URGENT CARE | Facility: URGENT CARE | Age: 65
End: 2025-03-11
Payer: COMMERCIAL

## 2025-03-11 VITALS
WEIGHT: 207 LBS | RESPIRATION RATE: 20 BRPM | TEMPERATURE: 98.5 F | HEART RATE: 80 BPM | OXYGEN SATURATION: 98 % | SYSTOLIC BLOOD PRESSURE: 138 MMHG | DIASTOLIC BLOOD PRESSURE: 84 MMHG | BODY MASS INDEX: 35.53 KG/M2

## 2025-03-11 DIAGNOSIS — N39.0 ACUTE UTI (URINARY TRACT INFECTION): Primary | ICD-10-CM

## 2025-03-11 DIAGNOSIS — R30.0 DYSURIA: ICD-10-CM

## 2025-03-11 LAB
ALBUMIN UR-MCNC: 30 MG/DL
APPEARANCE UR: CLEAR
BACTERIA #/AREA URNS HPF: ABNORMAL /HPF
BILIRUB UR QL STRIP: NEGATIVE
COLOR UR AUTO: YELLOW
GLUCOSE UR STRIP-MCNC: NEGATIVE MG/DL
HGB UR QL STRIP: ABNORMAL
KETONES UR STRIP-MCNC: NEGATIVE MG/DL
LEUKOCYTE ESTERASE UR QL STRIP: ABNORMAL
NITRATE UR QL: NEGATIVE
PH UR STRIP: 5.5 [PH] (ref 5–8)
RBC #/AREA URNS AUTO: ABNORMAL /HPF
SP GR UR STRIP: >=1.03 (ref 1–1.03)
SQUAMOUS #/AREA URNS AUTO: ABNORMAL /LPF
UROBILINOGEN UR STRIP-ACNC: 0.2 E.U./DL
WBC #/AREA URNS AUTO: ABNORMAL /HPF
WBC CLUMPS #/AREA URNS HPF: PRESENT /HPF

## 2025-03-11 PROCEDURE — 81001 URINALYSIS AUTO W/SCOPE: CPT | Performed by: FAMILY MEDICINE

## 2025-03-11 PROCEDURE — 3075F SYST BP GE 130 - 139MM HG: CPT | Performed by: FAMILY MEDICINE

## 2025-03-11 PROCEDURE — 3079F DIAST BP 80-89 MM HG: CPT | Performed by: FAMILY MEDICINE

## 2025-03-11 PROCEDURE — 87186 SC STD MICRODIL/AGAR DIL: CPT | Performed by: FAMILY MEDICINE

## 2025-03-11 PROCEDURE — 99213 OFFICE O/P EST LOW 20 MIN: CPT | Performed by: FAMILY MEDICINE

## 2025-03-11 RX ORDER — NITROFURANTOIN 25; 75 MG/1; MG/1
100 CAPSULE ORAL 2 TIMES DAILY
Qty: 10 CAPSULE | Refills: 0 | Status: SHIPPED | OUTPATIENT
Start: 2025-03-11 | End: 2025-03-16

## 2025-03-11 RX ORDER — FLUCONAZOLE 150 MG/1
150 TABLET ORAL ONCE
Qty: 2 TABLET | Refills: 0 | Status: SHIPPED | OUTPATIENT
Start: 2025-03-11 | End: 2025-03-11

## 2025-03-11 NOTE — PATIENT INSTRUCTIONS
Urinary Tract Infection (UTI) in Women: Care Instructions  Overview     A urinary tract infection (UTI) is an infection caused by bacteria. It can happen anywhere in the urinary tract. A UTI can happen in the:  Kidneys.  Ureters, the tubes that connect the kidneys to the bladder.  Bladder.  Urethra, where the urine comes out.  Most UTIs are bladder infections. They often cause pain or burning when you urinate.  Most UTIs can be cured with antibiotics. If you are prescribed antibiotics, be sure to complete your treatment so that the infection does not get worse.  Follow-up care is a key part of your treatment and safety. Be sure to make and go to all appointments, and call your doctor if you are having problems. It's also a good idea to know your test results and keep a list of the medicines you take.  How can you care for yourself at home?  Take your antibiotics as directed. Do not stop taking them just because you feel better. You need to take the full course of antibiotics.  Drink extra water and other fluids for the next day or two. This will help make the urine less concentrated and help wash out the bacteria that are causing the infection. (If you have kidney, heart, or liver disease and have to limit fluids, talk with your doctor before you increase the amount of fluids you drink.)  Avoid drinks that are carbonated or have caffeine. They can irritate the bladder.  Urinate often. Try to empty your bladder each time.  To relieve pain, take a hot bath or lay a heating pad set on low over your lower belly or genital area. Never go to sleep with a heating pad in place.  To prevent UTIs  Drink plenty of water each day. This helps you urinate often, which clears bacteria from your system. (If you have kidney, heart, or liver disease and have to limit fluids, talk with your doctor before you increase the amount of fluids you drink.)  Urinate when you need to.  If you are sexually active, urinate right after you have  "sex.  Change sanitary pads often.  Avoid douches, bubble baths, feminine hygiene sprays, and other feminine hygiene products that have deodorants.  After going to the bathroom, wipe from front to back.  When should you call for help?   Call your doctor now or seek immediate medical care if:    You have new or worse fever, chills, nausea, or vomiting.     You have new pain in your back just below your rib cage. This is called flank pain.     There is new blood or pus in your urine.     You have any problems with your antibiotic medicine.   Watch closely for changes in your health, and be sure to contact your doctor if:    You are not getting better after taking an antibiotic for 2 days.     Your symptoms go away but then come back.   Where can you learn more?  Go to https://www.[employer].net/patiented  Enter K848 in the search box to learn more about \"Urinary Tract Infection (UTI) in Women: Care Instructions.\"  Current as of: April 30, 2024  Content Version: 14.3    2024 [employer].   Care instructions adapted under license by your healthcare professional. If you have questions about a medical condition or this instruction, always ask your healthcare professional. [employer] disclaims any warranty or liability for your use of this information.    "

## 2025-03-11 NOTE — PROGRESS NOTES
Assessment:       Acute UTI (urinary tract infection)  - nitroFURantoin macrocrystal-monohydrate (MACROBID) 100 MG capsule  Dispense: 10 capsule; Refill: 0  - fluconazole (DIFLUCAN) 150 MG tablet  Dispense: 2 tablet; Refill: 0    Dysuria  - UA Macroscopic with reflex to Microscopic and Culture - Clinic Collect  - Urine Microscopic Exam  - Urine Culture    Plan:     Symptoms consistent with a urinary tract infection.  Antibiotics prescribed as noted above.  Urine culture is pending and will adjust antibiotic based on the culture and sensitivities as needed.  Recommend pushing fluids and follow-up if symptoms are getting worse or not improving over the next 2 to 3 days.    Prescription given for Diflucan in case she gets a yeast infection while taking antibiotics.      MEDICATIONS:   Orders Placed This Encounter   Medications    nitroFURantoin macrocrystal-monohydrate (MACROBID) 100 MG capsule     Sig: Take 1 capsule (100 mg) by mouth 2 times daily for 5 days.     Dispense:  10 capsule     Refill:  0    fluconazole (DIFLUCAN) 150 MG tablet     Sig: Take 1 tablet (150 mg) by mouth once for 1 dose. May take additional dose in 2 days if symptoms not resolved.     Dispense:  2 tablet     Refill:  0         Subjective:       64 year old female presents for evaluation     Patient Active Problem List   Diagnosis    Dyslipidemia    Menopause    Family history of Alzheimer's disease    Stage 3a chronic kidney disease (H)    Class 2 severe obesity due to excess calories with serious comorbidity and body mass index (BMI) of 36.0 to 36.9 in adult (H)       No past medical history on file.    Past Surgical History:   Procedure Laterality Date    MAMMOPLASTY AUGMENTATION Bilateral 1989       Current Outpatient Medications   Medication Sig Dispense Refill    aspirin 81 MG EC tablet Take 81 mg by mouth 2 times daily       CALCIUM-MAGNESIUM-ZINC-VIT D3 PO Take 1 tablet by mouth 2 times daily. Calcium 500 mg, vitamid D3 20 mcg       cholecalciferol, vitamin D3, 5,000 unit Tab Take 10,000 Units by mouth 2 times daily       Coenzyme Q10 (COQ10 PO) Take 1 tablet by mouth daily. (300 mg tablet)      ferrous sulfate 45 MG TBCR CR tablet Take 45 mg by mouth every other day.      metFORMIN (GLUCOPHAGE XR) 500 MG 24 hr tablet Take 1 tablet (500 mg) by mouth daily (with dinner). After 2 weeks, increase to 1 tablet twice daily if tolerating. 60 tablet 2    MULTIVITS-MIN/IRON/FA/LUTEIN (CENTRUM SILVER WOMEN ORAL) [MULTIVITS-MIN/IRON/FA/LUTEIN (CENTRUM SILVER WOMEN ORAL)] Take by mouth.      Omega-3-acid Ethyl Esters (FISH OIL OMEGA-3 FATTY ACID) 320MG/ML oral suspension Take 1,200 mg by mouth daily      progesterone (PROMETRIUM) 100 MG capsule [PROGESTERONE (PROMETRIUM) 100 MG CAPSULE] Take 100 mg by mouth 3 (three) times a day. 1 in the AM and 2 in the PM.      RESVERATROL ORAL Take 1 tablet by mouth 2 times daily.      rosuvastatin (CRESTOR) 40 MG tablet Take 1 tablet (40 mg) by mouth at bedtime. 90 tablet 0    Specialty Vitamins Products (HAIR NOURISHING SUPPLEMENT PO) Take 1 capsule by mouth daily. (Nutrafol)       No current facility-administered medications for this visit.       Allergies   Allergen Reactions    Ragweed Pollen [Ragweeds] Itching       Family History   Problem Relation Age of Onset    Diabetes Mother     Heart Disease Mother     Hypertension Mother     Hyperlipidemia Mother     Osteoporosis Mother     Hyperlipidemia Father     Cerebrovascular Disease Maternal Grandmother     Alzheimer Disease Sister        Social History     Socioeconomic History    Marital status:     Number of children: 3   Tobacco Use    Smoking status: Never     Passive exposure: Never    Smokeless tobacco: Never   Vaping Use    Vaping status: Never Used   Substance and Sexual Activity    Alcohol use: No    Drug use: No    Sexual activity: Yes     Social Drivers of Health     Financial Resource Strain: Low Risk  (4/4/2024)    Financial Resource Strain      Within the past 12 months, have you or your family members you live with been unable to get utilities (heat, electricity) when it was really needed?: No   Food Insecurity: Low Risk  (4/4/2024)    Food Insecurity     Within the past 12 months, did you worry that your food would run out before you got money to buy more?: No     Within the past 12 months, did the food you bought just not last and you didn t have money to get more?: No   Transportation Needs: Low Risk  (4/4/2024)    Transportation Needs     Within the past 12 months, has lack of transportation kept you from medical appointments, getting your medicines, non-medical meetings or appointments, work, or from getting things that you need?: No   Physical Activity: Insufficiently Active (4/4/2024)    Exercise Vital Sign     Days of Exercise per Week: 3 days     Minutes of Exercise per Session: 20 min   Stress: No Stress Concern Present (4/4/2024)    [employer] [place] [employer] - Occupational Stress Questionnaire     Feeling of Stress : Not at all   Social Connections: Unknown (4/4/2024)    Social Connection and Isolation Panel [NHANES]     Frequency of Social Gatherings with Friends and Family: Twice a week   Interpersonal Safety: Low Risk  (8/28/2024)    Interpersonal Safety     Do you feel physically and emotionally safe where you currently live?: Yes     Within the past 12 months, have you been hit, slapped, kicked or otherwise physically hurt by someone?: No     Within the past 12 months, have you been humiliated or emotionally abused in other ways by your partner or ex-partner?: No   Housing Stability: Low Risk  (4/4/2024)    Housing Stability     Do you have housing? : Yes     Are you worried about losing your housing?: No         Review of Systems  Pertinent items are noted in HPI.      Objective:     /84 (BP Location: Right arm, Patient Position: Sitting, Cuff Size: Adult Regular)   Pulse 80   Temp 98.5  F (36.9  C) (Oral)   Resp  20   Wt 93.9 kg (207 lb)   LMP 07/26/2005 (Exact Date)   SpO2 98%   BMI 35.53 kg/m       General appearance: alert, appears stated age, and cooperative  Back: No CVA tenderness  Abdomen: soft, non-tender; bowel sounds normal; no masses,  no organomegaly  Extremities: Warm and well-perfused      Results for orders placed or performed in visit on 03/11/25   UA Macroscopic with reflex to Microscopic and Culture - Clinic Collect     Status: Abnormal    Specimen: Urine, Clean Catch   Result Value Ref Range    Color Urine Yellow Colorless, Straw, Light Yellow, Yellow    Appearance Urine Clear Clear    Glucose Urine Negative Negative mg/dL    Bilirubin Urine Negative Negative    Ketones Urine Negative Negative mg/dL    Specific Gravity Urine >=1.030 1.005 - 1.030    Blood Urine Large (A) Negative    pH Urine 5.5 5.0 - 8.0    Protein Albumin Urine 30 (A) Negative mg/dL    Urobilinogen Urine 0.2 0.2, 1.0 E.U./dL    Nitrite Urine Negative Negative    Leukocyte Esterase Urine Moderate (A) Negative   Urine Microscopic Exam     Status: Abnormal   Result Value Ref Range    Bacteria Urine Moderate (A) None Seen /HPF    RBC Urine  (A) 0-2 /HPF /HPF    WBC Urine 25-50 (A) 0-5 /HPF /HPF    Squamous Epithelials Urine Few (A) None Seen /LPF    WBC Clumps Urine Present (A) None Seen /HPF       This note has been dictated using voice recognition software. Any grammatical or context distortions are unintentional and inherent to the software

## 2025-03-13 LAB
BACTERIA UR CULT: ABNORMAL
BACTERIA UR CULT: ABNORMAL

## 2025-05-01 ENCOUNTER — OFFICE VISIT (OUTPATIENT)
Dept: FAMILY MEDICINE | Facility: CLINIC | Age: 65
End: 2025-05-01
Payer: COMMERCIAL

## 2025-05-01 VITALS
TEMPERATURE: 99.3 F | BODY MASS INDEX: 35.53 KG/M2 | SYSTOLIC BLOOD PRESSURE: 124 MMHG | OXYGEN SATURATION: 95 % | RESPIRATION RATE: 18 BRPM | DIASTOLIC BLOOD PRESSURE: 78 MMHG | HEART RATE: 91 BPM | HEIGHT: 64 IN

## 2025-05-01 DIAGNOSIS — J06.9 UPPER RESPIRATORY TRACT INFECTION, UNSPECIFIED TYPE: ICD-10-CM

## 2025-05-01 DIAGNOSIS — R09.89 THROAT CLEARING: Primary | ICD-10-CM

## 2025-05-01 NOTE — PROGRESS NOTES
"  Assessment & Plan     1. Throat clearing    2. 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    Throat clearing and intermittent antacid use, possibly related to laryngopharyngeal reflux (LPR)    Uncomplicated UTI, improving with antibiotic therapy       Plan:   Reassurance regarding HPV:  Advised about past cervical HPV does not necessarily indicate oropharyngeal involvement    No current symptoms suggestive of malignancy (e.g., persistent sore throat, weight loss, hoarseness, neck mass)    ENT referral not urgently indicated, but can be arranged      throat clearing:  likely related to reflux/LPR  Recommend trial of daily PPI (e.g., omeprazole)         Orders Placed This Encounter   Procedures    Adult ENT  Referral         MED REC REQUIRED  Post Medication Reconciliation Status: discharge medications reconciled and changed, per note/orders  BMI  Estimated body mass index is 35.53 kg/m  as calculated from the following:    Height as of this encounter: 1.626 m (5' 4\").    Weight as of 3/11/25: 93.9 kg (207 lb).   Weight management plan: Discussed healthy diet and exercise guidelines        I spent a total of 30 minutes on the day of the visit.   Time spent by me today doing chart review, history and exam, documentation and further activities per the note    The longitudinal plan of care for the diagnosis(es)/condition(s) as documented were addressed during this visit. Due to the added complexity in care, I will continue to support Maria Elena in the subsequent management and with ongoing continuity of care.    Subjective           5/1/2025     1:14 PM   Additional Questions   Roomed by EVA Garrido      Maria Elena is a 65-year-old female presenting for follow-up after a recent urgent care visit for an upper respiratory infection and sore throat. She reports ongoing mild symptoms with gradual " "improvement.    Her primary concern is a history of HPV detected on prior Pap smears. She is fearful of the risk of HPV-related throat cancer and is inquiring about the need for ENT evaluation. She also notes that she clears her throat frequently and uses antacids intermittently for presumed reflux symptoms.    Additionally, she was recently treated for a urinary tract infection (UTI) with Keflex with symptomatic improvement and has 2 days of antibiotics remaining.        ED/UC Followup:    Facility:  [facility]   Date of visit: 04/25/2025  Reason for visit: Cough (Sore Throat).   Current Status: Patient states cough is getting better. Sore throat still bothering patient.             Objective    /78   Pulse 91   Temp 99.3  F (37.4  C) (Oral)   Resp 18   Ht 1.626 m (5' 4\")   LMP 07/26/2005 (Exact Date)   SpO2 95%   BMI 35.53 kg/m    Body mass index is 35.53 kg/m .  Physical Exam   GENERAL: alert and no distress  HENT: oropharynx clear and oral mucous membranes moist  NECK: no adenopathy, no asymmetry, masses, or scars            Signed Electronically by: Nik Wheeler MD    "

## 2025-05-05 ENCOUNTER — PATIENT OUTREACH (OUTPATIENT)
Dept: CARE COORDINATION | Facility: CLINIC | Age: 65
End: 2025-05-05
Payer: COMMERCIAL

## 2025-05-17 ENCOUNTER — HEALTH MAINTENANCE LETTER (OUTPATIENT)
Age: 65
End: 2025-05-17

## 2025-05-27 DIAGNOSIS — E78.5 HYPERLIPIDEMIA, UNSPECIFIED HYPERLIPIDEMIA TYPE: ICD-10-CM

## 2025-05-28 RX ORDER — ROSUVASTATIN CALCIUM 40 MG/1
40 TABLET, COATED ORAL AT BEDTIME
Qty: 30 TABLET | Refills: 0 | Status: SHIPPED | OUTPATIENT
Start: 2025-05-28

## 2025-05-30 NOTE — TELEPHONE ENCOUNTER
Contacts       Contact Date/Time Type Contact Phone/Fax    05/27/2025 11:41 AM CDT Fax (Incoming) [facility] - [place], [place] - [address] (Pharmacy) [phone]          Attempted to reach patient to: Schedule an appointment    When patient returns call, please take this action: Assist with scheduling    Reason for the visit: Preventive    When to schedule: Next available    Additional comments/info: AWV needed before next refill on medications     If unable to schedule: Transfer to  line (or update telephone encounter in after-hours)

## 2025-07-01 DIAGNOSIS — E78.5 HYPERLIPIDEMIA, UNSPECIFIED HYPERLIPIDEMIA TYPE: ICD-10-CM

## 2025-07-01 RX ORDER — ROSUVASTATIN CALCIUM 40 MG/1
40 TABLET, COATED ORAL AT BEDTIME
Qty: 60 TABLET | Refills: 0 | Status: SHIPPED | OUTPATIENT
Start: 2025-07-01

## 2025-07-03 ENCOUNTER — OFFICE VISIT (OUTPATIENT)
Dept: URGENT CARE | Facility: URGENT CARE | Age: 65
End: 2025-07-03
Payer: COMMERCIAL

## 2025-07-03 VITALS
DIASTOLIC BLOOD PRESSURE: 76 MMHG | TEMPERATURE: 98.2 F | RESPIRATION RATE: 20 BRPM | HEART RATE: 83 BPM | SYSTOLIC BLOOD PRESSURE: 118 MMHG | OXYGEN SATURATION: 95 %

## 2025-07-03 DIAGNOSIS — B37.9 ANTIBIOTIC-INDUCED YEAST INFECTION: ICD-10-CM

## 2025-07-03 DIAGNOSIS — T36.95XA ANTIBIOTIC-INDUCED YEAST INFECTION: ICD-10-CM

## 2025-07-03 DIAGNOSIS — N39.0 ACUTE UTI: Primary | ICD-10-CM

## 2025-07-03 LAB
ALBUMIN UR-MCNC: ABNORMAL MG/DL
APPEARANCE UR: ABNORMAL
BACTERIA #/AREA URNS HPF: ABNORMAL /HPF
BILIRUB UR QL STRIP: NEGATIVE
COLOR UR AUTO: ABNORMAL
GLUCOSE UR STRIP-MCNC: 100 MG/DL
HGB UR QL STRIP: ABNORMAL
KETONES UR STRIP-MCNC: NEGATIVE MG/DL
LEUKOCYTE ESTERASE UR QL STRIP: ABNORMAL
NITRATE UR QL: POSITIVE
PH UR STRIP: 6.5 [PH] (ref 5–8)
RBC #/AREA URNS AUTO: ABNORMAL /HPF
SP GR UR STRIP: 1.01 (ref 1–1.03)
SQUAMOUS #/AREA URNS AUTO: ABNORMAL /LPF
UROBILINOGEN UR STRIP-ACNC: 1 E.U./DL
WBC #/AREA URNS AUTO: ABNORMAL /HPF
WBC CLUMPS #/AREA URNS HPF: PRESENT /HPF

## 2025-07-03 RX ORDER — NITROFURANTOIN 25; 75 MG/1; MG/1
100 CAPSULE ORAL 2 TIMES DAILY
Qty: 10 CAPSULE | Refills: 0 | Status: SHIPPED | OUTPATIENT
Start: 2025-07-03 | End: 2025-07-08

## 2025-07-03 RX ORDER — FLUCONAZOLE 150 MG/1
150 TABLET ORAL ONCE
Qty: 1 TABLET | Refills: 0 | Status: SHIPPED | OUTPATIENT
Start: 2025-07-03 | End: 2025-07-03

## 2025-07-03 NOTE — PROGRESS NOTES
Urgent Care Clinic Visit    Chief Complaint   Patient presents with    UTI     X 0300 today. Burning with urination, frequency and urgency, no blood in urine, taking AZO. No vaginal discharge or itching. No abdominal pain. No foul odor.                7/3/2025    10:03 AM   Additional Questions   Roomed by Nati Vail MA   Accompanied by Self         7/3/2025   Declines Weight   Did patient decline having their weight taken? Yes     Pre-Provider Visit Orders- Urinalysis UA/UC  Patient reports the following symptoms:  possible urinary tract infection (UTI) , possible bladder infection , difficulty with urination , and frequent urination   Does the patient report any of the following symptoms: vaginal discharge, vaginal itching, possible yeast infection, has a urinary catheter in place, or unable to void in a specimen cup?  No    {OK to proceed with order      Nati Vail MA on 07/03/2025 at 10:05 AM

## 2025-07-03 NOTE — PROGRESS NOTES
URGENT CARE  Assessment & Plan   Assessment:   Maria Elena Abreu is a 65 year old female who's clinical presentation today is consistent with:   1. Acute UTI    - UA Macroscopic with reflex to Microscopic and Culture - Clinic Collect  - nitroFURantoin macrocrystal-monohydrate (MACROBID) 100 MG capsule;   - Adult Uro/Gyn  Referral; Future  2. History of antibiotic-induced yeast infection  - fluconazole (DIFLUCAN) 150 MG tablet;    Plan:  Will treat patient's suspected cystitis (recurrent) with antibiotics at this time, culture pending for bacterial growth confirmation of a UTI and for antibiotic susceptibilities, will call as needed, no concerns for progression to pyelonephritis at this time.  Given patient's history of antibiotic induced yeast infection we will also send her with fluticasone.  due to recurrence recommend patient follow up with uro/gyn for further evaluation and possible prevention strategies, referral placed   Additionally we discussed if symptoms do not improve after starting today's treatment to follow up in 5-7 days, sooner if symptoms worsen, return precautions given    No alarm signs or symptoms present   Differential Diagnoses for this patient's chief complaint that I considered include:  Bacterial vaginosis, candidiasis, Vulvovaginitis, atrophic vaginitis, Overactive bladder, urethritis, interstitial cystitis, urethral irritation,  Pyelonephritis, nephrolithiasis, Pelvic organ (uterine/bladder) prolapse, Foreign body in bladder,  Atypical etiology of cystitis: fungal, viral, contact vs allergic vaginitis,  Malignancy (vaginal, ovarian, cervical)     Zayda Brenner, ESSIE [facility] [place]    ______________________________________________________________________    Subjective     HPI: Maria Elena Abreu  is a 65 year old  female who presents today for evaluation the following concerns:   Maria Elena reports experiencing urinary frequency and urgency, along with a burning  sensation during urination, started today at 3am. She notes that she is postmenopausal and has been experiencing these symptoms more frequently, attributing them possibly to hormonal changes and differences in estrogen levels. She mentions that she might not be drinking enough water or not emptying her bladder completely, which could be contributing factors. Maria Elena states that this is her fifth episode in the last year to year and a half, which is more frequent than she has experienced in the past. She recalls a recent urinary tract infection (UTI) around February or March, for which she sought treatment at an urgency room on [facility], as the wait time at her usual clinic was over three hours. She has been using AZO (Pyridium) to relieve the burning sensation, which turns her urine orange. Maria Elena reports no abdominal or back/flank pain associated with these symptoms. Denies fevers   Maria Elena attempted to provide a urine sample during the visit but accidentally dropped it into the toilet.    Review of Systems:  Pertinent review of systems as reflected in HPI, otherwise negative.     Objective    Physical Exam:  Vitals:    07/03/25 1003   BP: 118/76   Pulse: 83   Resp: 20   Temp: 98.2  F (36.8  C)   TempSrc: Tympanic   SpO2: 95%      General: Alert and oriented, no acute distress, afebrile, normotensive  Psy/mental status: Nonanxious, cooperative  SKIN: Intact, no open areas  ABDOMEN:  soft, non-tender, non-distended    No flank pain or CVA tenderness   Pelvic/ :   Deferred    LABS:   Results for orders placed or performed in visit on 07/03/25   UA Macroscopic with reflex to Microscopic and Culture - Clinic Collect     Status: Abnormal    Specimen: Urine, Clean Catch   Result Value Ref Range    Color Urine Dark Yellow (A) Colorless, Straw, Light Yellow, Yellow    Appearance Urine Slightly Cloudy (A) Clear    Glucose Urine 100 (A) Negative mg/dL    Bilirubin Urine Negative Negative    Ketones Urine Negative Negative  mg/dL    Specific Gravity Urine 1.010 1.005 - 1.030    Blood Urine Small (A) Negative    pH Urine 6.5 5.0 - 8.0    Protein Albumin Urine Trace (A) Negative mg/dL    Urobilinogen Urine 1.0 0.2, 1.0 E.U./dL    Nitrite Urine Positive (A) Negative    Leukocyte Esterase Urine Moderate (A) Negative   Urine Microscopic Exam     Status: Abnormal   Result Value Ref Range    Bacteria Urine Moderate (A) None Seen /HPF    RBC Urine 5-10 (A) 0-2 /HPF /HPF    WBC Urine  (A) 0-5 /HPF /HPF    Squamous Epithelials Urine Few (A) None Seen /LPF    WBC Clumps Urine Present (A) None Seen /HPF        ______________________________________________________________________    I explained my diagnostic considerations and recommendations to the patient  All questions were answered.   Please see AVS for any patient instructions & handouts given.

## 2025-07-04 ENCOUNTER — RESULTS FOLLOW-UP (OUTPATIENT)
Dept: URGENT CARE | Facility: URGENT CARE | Age: 65
End: 2025-07-04

## 2025-07-05 ENCOUNTER — PATIENT OUTREACH (OUTPATIENT)
Dept: CARE COORDINATION | Facility: CLINIC | Age: 65
End: 2025-07-05
Payer: COMMERCIAL

## 2025-07-07 ENCOUNTER — PATIENT OUTREACH (OUTPATIENT)
Dept: CARE COORDINATION | Facility: CLINIC | Age: 65
End: 2025-07-07
Payer: COMMERCIAL

## 2025-08-06 ENCOUNTER — TRANSFERRED RECORDS (OUTPATIENT)
Dept: HEALTH INFORMATION MANAGEMENT | Facility: CLINIC | Age: 65
End: 2025-08-06
Payer: COMMERCIAL

## 2025-08-21 ENCOUNTER — OFFICE VISIT (OUTPATIENT)
Dept: INTERNAL MEDICINE | Facility: CLINIC | Age: 65
End: 2025-08-21
Payer: COMMERCIAL

## 2025-08-21 VITALS
TEMPERATURE: 99.7 F | SYSTOLIC BLOOD PRESSURE: 114 MMHG | OXYGEN SATURATION: 95 % | DIASTOLIC BLOOD PRESSURE: 76 MMHG | HEIGHT: 64 IN | RESPIRATION RATE: 12 BRPM | BODY MASS INDEX: 35.08 KG/M2 | WEIGHT: 205.5 LBS | HEART RATE: 74 BPM

## 2025-08-21 DIAGNOSIS — E66.09 CLASS 2 OBESITY DUE TO EXCESS CALORIES IN ADULT, UNSPECIFIED BMI, UNSPECIFIED WHETHER SERIOUS COMORBIDITY PRESENT: ICD-10-CM

## 2025-08-21 DIAGNOSIS — Z12.31 ENCOUNTER FOR SCREENING MAMMOGRAM FOR BREAST CANCER: ICD-10-CM

## 2025-08-21 DIAGNOSIS — R73.01 ELEVATED FASTING BLOOD SUGAR: ICD-10-CM

## 2025-08-21 DIAGNOSIS — E66.01 CLASS 2 SEVERE OBESITY WITH SERIOUS COMORBIDITY AND BODY MASS INDEX (BMI) OF 35.0 TO 35.9 IN ADULT, UNSPECIFIED OBESITY TYPE (H): ICD-10-CM

## 2025-08-21 DIAGNOSIS — E66.812 CLASS 2 OBESITY DUE TO EXCESS CALORIES IN ADULT, UNSPECIFIED BMI, UNSPECIFIED WHETHER SERIOUS COMORBIDITY PRESENT: ICD-10-CM

## 2025-08-21 DIAGNOSIS — Z00.00 ROUTINE GENERAL MEDICAL EXAMINATION AT A HEALTH CARE FACILITY: Primary | ICD-10-CM

## 2025-08-21 DIAGNOSIS — E78.5 HYPERLIPIDEMIA, UNSPECIFIED HYPERLIPIDEMIA TYPE: ICD-10-CM

## 2025-08-21 DIAGNOSIS — E66.812 CLASS 2 SEVERE OBESITY WITH SERIOUS COMORBIDITY AND BODY MASS INDEX (BMI) OF 35.0 TO 35.9 IN ADULT, UNSPECIFIED OBESITY TYPE (H): ICD-10-CM

## 2025-08-21 DIAGNOSIS — N18.30 STAGE 3 CHRONIC KIDNEY DISEASE, UNSPECIFIED WHETHER STAGE 3A OR 3B CKD (H): ICD-10-CM

## 2025-08-21 DIAGNOSIS — Z13.6 SCREENING FOR CARDIOVASCULAR CONDITION: ICD-10-CM

## 2025-08-21 DIAGNOSIS — Z78.0 POSTMENOPAUSAL STATUS: ICD-10-CM

## 2025-08-21 DIAGNOSIS — K21.9 GASTROESOPHAGEAL REFLUX DISEASE WITHOUT ESOPHAGITIS: ICD-10-CM

## 2025-08-21 DIAGNOSIS — E55.9 VITAMIN D DEFICIENCY: ICD-10-CM

## 2025-08-21 DIAGNOSIS — Z13.0 SCREENING, ANEMIA, DEFICIENCY, IRON: ICD-10-CM

## 2025-08-21 LAB
ALBUMIN UR-MCNC: NEGATIVE MG/DL
ANION GAP SERPL CALCULATED.3IONS-SCNC: 11 MMOL/L (ref 7–15)
APPEARANCE UR: CLEAR
BASOPHILS # BLD AUTO: 0.06 10E3/UL (ref 0–0.2)
BASOPHILS NFR BLD AUTO: 0.8 %
BILIRUB UR QL STRIP: NEGATIVE
BUN SERPL-MCNC: 14.1 MG/DL (ref 8–23)
CALCIUM SERPL-MCNC: 9.9 MG/DL (ref 8.8–10.4)
CHLORIDE SERPL-SCNC: 102 MMOL/L (ref 98–107)
CHOLEST SERPL-MCNC: 141 MG/DL
COLOR UR AUTO: YELLOW
CREAT SERPL-MCNC: 0.93 MG/DL (ref 0.51–0.95)
CREAT UR-MCNC: 57 MG/DL
EGFRCR SERPLBLD CKD-EPI 2021: 67 ML/MIN/1.73M2
EOSINOPHIL # BLD AUTO: 0.33 10E3/UL (ref 0–0.7)
EOSINOPHIL NFR BLD AUTO: 4.4 %
ERYTHROCYTE [DISTWIDTH] IN BLOOD BY AUTOMATED COUNT: 12.2 % (ref 10–15)
EST. AVERAGE GLUCOSE BLD GHB EST-MCNC: 140 MG/DL
FASTING STATUS PATIENT QL REPORTED: YES
FASTING STATUS PATIENT QL REPORTED: YES
FERRITIN SERPL-MCNC: 177 NG/ML (ref 11–328)
GLUCOSE SERPL-MCNC: 119 MG/DL (ref 70–99)
GLUCOSE UR STRIP-MCNC: NEGATIVE MG/DL
HBA1C MFR BLD: 6.5 % (ref 0–5.6)
HCO3 SERPL-SCNC: 26 MMOL/L (ref 22–29)
HCT VFR BLD AUTO: 43.2 % (ref 35–47)
HDLC SERPL-MCNC: 45 MG/DL
HGB BLD-MCNC: 14.3 G/DL (ref 11.7–15.7)
HGB UR QL STRIP: NEGATIVE
IMM GRANULOCYTES # BLD: <0.04 10E3/UL
IMM GRANULOCYTES NFR BLD: 0.1 %
IRON BINDING CAPACITY (ROCHE): 383 UG/DL (ref 240–430)
IRON SATN MFR SERPL: 30 % (ref 15–46)
IRON SERPL-MCNC: 116 UG/DL (ref 37–145)
KETONES UR STRIP-MCNC: NEGATIVE MG/DL
LDLC SERPL CALC-MCNC: 63 MG/DL
LEUKOCYTE ESTERASE UR QL STRIP: NEGATIVE
LYMPHOCYTES # BLD AUTO: 3.4 10E3/UL (ref 0.8–5.3)
LYMPHOCYTES NFR BLD AUTO: 45.5 %
MCH RBC QN AUTO: 31.2 PG (ref 26.5–33)
MCHC RBC AUTO-ENTMCNC: 33.1 G/DL (ref 31.5–36.5)
MCV RBC AUTO: 94.1 FL (ref 78–100)
MICROALBUMIN UR-MCNC: <12 MG/L
MICROALBUMIN/CREAT UR: NORMAL MG/G{CREAT}
MONOCYTES # BLD AUTO: 0.55 10E3/UL (ref 0–1.3)
MONOCYTES NFR BLD AUTO: 7.4 %
NEUTROPHILS # BLD AUTO: 3.13 10E3/UL (ref 1.6–8.3)
NEUTROPHILS NFR BLD AUTO: 41.8 %
NITRATE UR QL: NEGATIVE
NONHDLC SERPL-MCNC: 96 MG/DL
PH UR STRIP: 5.5 [PH] (ref 5–8)
PLATELET # BLD AUTO: 294 10E3/UL (ref 150–450)
POTASSIUM SERPL-SCNC: 4.6 MMOL/L (ref 3.4–5.3)
RBC # BLD AUTO: 4.59 10E6/UL (ref 3.8–5.2)
SODIUM SERPL-SCNC: 139 MMOL/L (ref 135–145)
SP GR UR STRIP: 1.01 (ref 1–1.03)
TRIGL SERPL-MCNC: 165 MG/DL
UROBILINOGEN UR STRIP-ACNC: 0.2 E.U./DL
WBC # BLD AUTO: 7.48 10E3/UL (ref 4–11)

## 2025-08-21 RX ORDER — ESTRADIOL 0.1 MG/G
CREAM VAGINAL DAILY
COMMUNITY
Start: 2025-08-06

## 2025-08-21 RX ORDER — ROSUVASTATIN CALCIUM 40 MG/1
40 TABLET, COATED ORAL AT BEDTIME
Qty: 90 TABLET | Refills: 3 | Status: SHIPPED | OUTPATIENT
Start: 2025-08-21

## 2025-08-21 SDOH — HEALTH STABILITY: PHYSICAL HEALTH: ON AVERAGE, HOW MANY MINUTES DO YOU ENGAGE IN EXERCISE AT THIS LEVEL?: 20 MIN

## 2025-08-21 SDOH — HEALTH STABILITY: PHYSICAL HEALTH: ON AVERAGE, HOW MANY DAYS PER WEEK DO YOU ENGAGE IN MODERATE TO STRENUOUS EXERCISE (LIKE A BRISK WALK)?: 2 DAYS

## 2025-08-21 ASSESSMENT — SOCIAL DETERMINANTS OF HEALTH (SDOH): HOW OFTEN DO YOU GET TOGETHER WITH FRIENDS OR RELATIVES?: MORE THAN THREE TIMES A WEEK

## 2025-08-21 ASSESSMENT — PAIN SCALES - GENERAL: PAINLEVEL_OUTOF10: NO PAIN (0)

## 2025-08-25 ENCOUNTER — RESULTS FOLLOW-UP (OUTPATIENT)
Dept: INTERNAL MEDICINE | Facility: CLINIC | Age: 65
End: 2025-08-25
Payer: COMMERCIAL

## 2025-08-25 DIAGNOSIS — E11.9 NEW ONSET TYPE 2 DIABETES MELLITUS (H): Primary | ICD-10-CM

## 2025-08-30 RX ORDER — METFORMIN HYDROCHLORIDE 500 MG/1
500 TABLET, EXTENDED RELEASE ORAL
Qty: 90 TABLET | Refills: 1 | Status: SHIPPED | OUTPATIENT
Start: 2025-08-30

## 2025-09-01 ENCOUNTER — PATIENT OUTREACH (OUTPATIENT)
Dept: CARE COORDINATION | Facility: CLINIC | Age: 65
End: 2025-09-01
Payer: COMMERCIAL

## 2025-09-03 ENCOUNTER — PATIENT OUTREACH (OUTPATIENT)
Dept: CARE COORDINATION | Facility: CLINIC | Age: 65
End: 2025-09-03
Payer: COMMERCIAL